# Patient Record
Sex: FEMALE | Race: BLACK OR AFRICAN AMERICAN
[De-identification: names, ages, dates, MRNs, and addresses within clinical notes are randomized per-mention and may not be internally consistent; named-entity substitution may affect disease eponyms.]

---

## 2017-07-18 ENCOUNTER — HOSPITAL ENCOUNTER (EMERGENCY)
Dept: HOSPITAL 9 - MADERS | Age: 42
Discharge: ANOTHER HOSPITAL | End: 2017-07-18
Payer: SELF-PAY

## 2017-07-18 DIAGNOSIS — E11.65: Primary | ICD-10-CM

## 2017-07-18 DIAGNOSIS — E78.5: ICD-10-CM

## 2017-07-18 DIAGNOSIS — F41.9: ICD-10-CM

## 2017-07-18 DIAGNOSIS — F32.9: ICD-10-CM

## 2017-07-18 DIAGNOSIS — I42.9: ICD-10-CM

## 2017-07-18 DIAGNOSIS — Z79.4: ICD-10-CM

## 2017-07-18 DIAGNOSIS — I50.9: ICD-10-CM

## 2017-07-18 DIAGNOSIS — E86.0: ICD-10-CM

## 2017-07-18 DIAGNOSIS — Z79.899: ICD-10-CM

## 2017-07-18 DIAGNOSIS — I25.10: ICD-10-CM

## 2017-07-18 DIAGNOSIS — Z79.82: ICD-10-CM

## 2017-07-18 DIAGNOSIS — I11.0: ICD-10-CM

## 2017-07-18 DIAGNOSIS — Z95.0: ICD-10-CM

## 2017-07-18 LAB
ALBUMIN SERPL BCG-MCNC: 4 G/DL (ref 3.5–5)
ALP SERPL-CCNC: 96 U/L (ref 40–150)
ALT SERPL W P-5'-P-CCNC: 30 U/L (ref 8–55)
ANION GAP SERPL CALC-SCNC: (no result) MMOL/L (ref 10–20)
APTT PPP: 29.4 SEC (ref 22.9–36.1)
AST SERPL-CCNC: 30 U/L (ref 5–34)
BILIRUB SERPL-MCNC: (no result) MG/DL (ref 0.2–1.2)
BUN SERPL-MCNC: 12 MG/DL (ref 7–18.7)
CALCIUM SERPL-MCNC: 9.7 MG/DL (ref 7.8–10.44)
CHLORIDE SERPL-SCNC: 90 MMOL/L (ref 98–107)
CK MB SERPL-MCNC: 0.6 NG/ML (ref 0–6.6)
CO2 SERPL-SCNC: 10 MMOL/L (ref 22–29)
COMM CRITICAL RESULTS DOC: (no result)
COMM CRITICAL RESULTS DOC: (no result)
CREAT CL PREDICTED SERPL C-G-VRATE: 0 ML/MIN (ref 70–130)
DRUG SCREEN CUTOFF: (no result)
GLOBULIN SER CALC-MCNC: 6.3 G/DL (ref 2.4–3.5)
GLUCOSE SERPL-MCNC: 571 MG/DL (ref 70–105)
GLUCOSE UR STRIP-MCNC: >=1000 MG/DL
HGB BLD-MCNC: 12.5 G/DL (ref 12–16)
INR PPP: 0.9
MCH RBC QN AUTO: 28.2 PG (ref 27–31)
MCV RBC AUTO: 78.3 FL (ref 81–99)
MDIFF COMPLETE?: YES
MEDTOX CONTROL LINE VALID?: (no result)
PLATELET # BLD AUTO: 256 THOU/UL (ref 130–400)
PLATELET BLD QL SMEAR: (no result)
POTASSIUM SERPL-SCNC: 4.5 MMOL/L (ref 3.5–5.1)
PROTHROMBIN TIME: 12.7 SEC (ref 12–14.7)
RBC # BLD AUTO: 4.43 MILL/UL (ref 4.2–5.4)
SODIUM SERPL-SCNC: 127 MMOL/L (ref 136–145)
SP GR UR STRIP: 1.03 (ref 1–1.04)
TROPONIN I SERPL DL<=0.01 NG/ML-MCNC: 0.02 NG/ML (ref ?–0.03)
WBC # BLD AUTO: 6.5 THOU/UL (ref 4.8–10.8)

## 2017-07-18 PROCEDURE — 96361 HYDRATE IV INFUSION ADD-ON: CPT

## 2017-07-18 PROCEDURE — 80053 COMPREHEN METABOLIC PANEL: CPT

## 2017-07-18 PROCEDURE — 96376 TX/PRO/DX INJ SAME DRUG ADON: CPT

## 2017-07-18 PROCEDURE — 85730 THROMBOPLASTIN TIME PARTIAL: CPT

## 2017-07-18 PROCEDURE — 81003 URINALYSIS AUTO W/O SCOPE: CPT

## 2017-07-18 PROCEDURE — 80306 DRUG TEST PRSMV INSTRMNT: CPT

## 2017-07-18 PROCEDURE — 85025 COMPLETE CBC W/AUTO DIFF WBC: CPT

## 2017-07-18 PROCEDURE — 71010: CPT

## 2017-07-18 PROCEDURE — 84443 ASSAY THYROID STIM HORMONE: CPT

## 2017-07-18 PROCEDURE — 36416 COLLJ CAPILLARY BLOOD SPEC: CPT

## 2017-07-18 PROCEDURE — 93005 ELECTROCARDIOGRAM TRACING: CPT

## 2017-07-18 PROCEDURE — 82553 CREATINE MB FRACTION: CPT

## 2017-07-18 PROCEDURE — 94760 N-INVAS EAR/PLS OXIMETRY 1: CPT

## 2017-07-18 PROCEDURE — 87040 BLOOD CULTURE FOR BACTERIA: CPT

## 2017-07-18 PROCEDURE — 96374 THER/PROPH/DIAG INJ IV PUSH: CPT

## 2017-07-18 PROCEDURE — 83605 ASSAY OF LACTIC ACID: CPT

## 2017-07-18 PROCEDURE — 83880 ASSAY OF NATRIURETIC PEPTIDE: CPT

## 2017-07-18 PROCEDURE — 85610 PROTHROMBIN TIME: CPT

## 2017-07-18 PROCEDURE — 84484 ASSAY OF TROPONIN QUANT: CPT

## 2017-07-18 NOTE — RAD
AP VIEW CHEST:

7/18/17

 

HISTORY: 

Syncope.

 

AP view chest is obtained on 7/18/17.

 

Comparison made to previous exam from 12/8/16.

 

AP view chest demonstrates a dual lead intracardiac defibrillator. Mild cardiomegaly is seen. The adarsh
ngs are well aerated. No evidence of active intrathoracic disease seen. No evidence of effusions, pn
eumonia or pneumothorax seen.

 

IMPRESSION:  

Cardiomegaly.

 

POS: [facility]

## 2018-03-16 ENCOUNTER — HOSPITAL ENCOUNTER (INPATIENT)
Dept: HOSPITAL 92 - ERS | Age: 43
LOS: 10 days | Discharge: HOME | DRG: 292 | End: 2018-03-26
Attending: FAMILY MEDICINE | Admitting: FAMILY MEDICINE
Payer: COMMERCIAL

## 2018-03-16 VITALS — BODY MASS INDEX: 22.2 KG/M2

## 2018-03-16 DIAGNOSIS — E78.5: ICD-10-CM

## 2018-03-16 DIAGNOSIS — F32.9: ICD-10-CM

## 2018-03-16 DIAGNOSIS — I42.0: ICD-10-CM

## 2018-03-16 DIAGNOSIS — F41.9: ICD-10-CM

## 2018-03-16 DIAGNOSIS — I11.0: Primary | ICD-10-CM

## 2018-03-16 DIAGNOSIS — Z88.5: ICD-10-CM

## 2018-03-16 DIAGNOSIS — E87.6: ICD-10-CM

## 2018-03-16 DIAGNOSIS — I25.10: ICD-10-CM

## 2018-03-16 DIAGNOSIS — Z79.4: ICD-10-CM

## 2018-03-16 DIAGNOSIS — Z79.82: ICD-10-CM

## 2018-03-16 DIAGNOSIS — E11.649: ICD-10-CM

## 2018-03-16 DIAGNOSIS — I95.9: ICD-10-CM

## 2018-03-16 DIAGNOSIS — I50.23: ICD-10-CM

## 2018-03-16 DIAGNOSIS — Z88.1: ICD-10-CM

## 2018-03-16 DIAGNOSIS — I08.1: ICD-10-CM

## 2018-03-16 DIAGNOSIS — N17.9: ICD-10-CM

## 2018-03-16 DIAGNOSIS — D50.9: ICD-10-CM

## 2018-03-16 DIAGNOSIS — Z95.810: ICD-10-CM

## 2018-03-16 LAB
ALBUMIN SERPL BCG-MCNC: 4.3 G/DL (ref 3.5–5)
ALP SERPL-CCNC: 57 U/L (ref 40–150)
ALT SERPL W P-5'-P-CCNC: 17 U/L (ref 8–55)
ANION GAP SERPL CALC-SCNC: 14 MMOL/L (ref 10–20)
AST SERPL-CCNC: 12 U/L (ref 5–34)
BASOPHILS # BLD AUTO: 0 THOU/UL (ref 0–0.2)
BASOPHILS NFR BLD AUTO: 0.8 % (ref 0–1)
BILIRUB SERPL-MCNC: 1.2 MG/DL (ref 0.2–1.2)
BUN SERPL-MCNC: 17 MG/DL (ref 7–18.7)
CALCIUM SERPL-MCNC: 9.5 MG/DL (ref 7.8–10.44)
CHLORIDE SERPL-SCNC: 101 MMOL/L (ref 98–107)
CK MB SERPL-MCNC: 0.5 NG/ML (ref 0–6.6)
CO2 SERPL-SCNC: 29 MMOL/L (ref 22–29)
CREAT CL PREDICTED SERPL C-G-VRATE: 0 ML/MIN (ref 70–130)
EOSINOPHIL # BLD AUTO: 0.1 THOU/UL (ref 0–0.7)
EOSINOPHIL NFR BLD AUTO: 1.3 % (ref 0–10)
GLOBULIN SER CALC-MCNC: 3.2 G/DL (ref 2.4–3.5)
GLUCOSE SERPL-MCNC: 260 MG/DL (ref 70–105)
HGB BLD-MCNC: 10.7 G/DL (ref 12–16)
LYMPHOCYTES # BLD: 1.4 THOU/UL (ref 1.2–3.4)
LYMPHOCYTES NFR BLD AUTO: 26.9 % (ref 21–51)
MCH RBC QN AUTO: 26.5 PG (ref 27–31)
MCV RBC AUTO: 78.2 FL (ref 81–99)
MONOCYTES # BLD AUTO: 0.2 THOU/UL (ref 0.11–0.59)
MONOCYTES NFR BLD AUTO: 4.6 % (ref 0–10)
NEUTROPHILS # BLD AUTO: 3.3 THOU/UL (ref 1.4–6.5)
NEUTROPHILS NFR BLD AUTO: 66.4 % (ref 42–75)
PLATELET # BLD AUTO: 202 THOU/UL (ref 130–400)
POTASSIUM SERPL-SCNC: 3.3 MMOL/L (ref 3.5–5.1)
RBC # BLD AUTO: 4.03 MILL/UL (ref 4.2–5.4)
SODIUM SERPL-SCNC: 141 MMOL/L (ref 136–145)
TROPONIN I SERPL DL<=0.01 NG/ML-MCNC: 0.01 NG/ML (ref ?–0.03)
WBC # BLD AUTO: 5 THOU/UL (ref 4.8–10.8)

## 2018-03-16 PROCEDURE — 82553 CREATINE MB FRACTION: CPT

## 2018-03-16 PROCEDURE — 85025 COMPLETE CBC W/AUTO DIFF WBC: CPT

## 2018-03-16 PROCEDURE — 93798 PHYS/QHP OP CAR RHAB W/ECG: CPT

## 2018-03-16 PROCEDURE — 93005 ELECTROCARDIOGRAM TRACING: CPT

## 2018-03-16 PROCEDURE — 96374 THER/PROPH/DIAG INJ IV PUSH: CPT

## 2018-03-16 PROCEDURE — 80053 COMPREHEN METABOLIC PANEL: CPT

## 2018-03-16 PROCEDURE — 71046 X-RAY EXAM CHEST 2 VIEWS: CPT

## 2018-03-16 PROCEDURE — 84484 ASSAY OF TROPONIN QUANT: CPT

## 2018-03-16 PROCEDURE — 80048 BASIC METABOLIC PNL TOTAL CA: CPT

## 2018-03-16 PROCEDURE — 83550 IRON BINDING TEST: CPT

## 2018-03-16 PROCEDURE — 36415 COLL VENOUS BLD VENIPUNCTURE: CPT

## 2018-03-16 PROCEDURE — 36416 COLLJ CAPILLARY BLOOD SPEC: CPT

## 2018-03-16 PROCEDURE — 82728 ASSAY OF FERRITIN: CPT

## 2018-03-16 PROCEDURE — 83540 ASSAY OF IRON: CPT

## 2018-03-16 PROCEDURE — A4216 STERILE WATER/SALINE, 10 ML: HCPCS

## 2018-03-16 PROCEDURE — 83880 ASSAY OF NATRIURETIC PEPTIDE: CPT

## 2018-03-16 PROCEDURE — 93306 TTE W/DOPPLER COMPLETE: CPT

## 2018-03-16 PROCEDURE — 85610 PROTHROMBIN TIME: CPT

## 2018-03-16 RX ADMIN — INSULIN LISPRO PRN UNIT: 100 INJECTION, SOLUTION INTRAVENOUS; SUBCUTANEOUS at 21:29

## 2018-03-16 NOTE — HP
PRIMARY CARE PHYSICIAN:  Dr. Alton Her.

 

CHIEF COMPLAINT:  Cough and shortness of breath.

 

HISTORY OF PRESENT ILLNESS:  This is a 42-year-old -American female with 
a known history of severe cardiomyopathy with ejection fraction of 15%-20% with 
a defibrillator placed.  She follows with Dr. Daily as well as with a clinic 
in [place].  She reports that for the last week, she has had shortness of breath 
and cough productive of foamy sputum.  She would see her primary care doctor 
one week ago, was given amoxicillin and Flonase, which did not help the 
symptoms.  She was called by the cardiologist's office on Tuesday and told that 
her monitor was reporting that she was fluid overloaded.  They told her to 
increase her Lasix to 2 tablets of 40 mg in the morning and 3 tablets in the 
afternoon.  She stated she has been doing this without any noticeable increase 
in urine output.  She also states she has been restricting her fluids.  She has 
persistent cough and shortness of breath especially with ambulation.  She went 
to the Cardiology's office today.  She said for a Coumadin level, although I do 
not see that she has ever been on Coumadin and they told her at that time, she 
did go to the emergency room to get her fluid status checked.  In the ER, she 
was found to have a massively elevated brain natriuretic peptide as well as her 
dyspnea and cough.  She did not have any hypoxia in the emergency room.  She 
was low normal blood pressure and mildly tachycardic.

 

PAST MEDICAL HISTORY:

1.  Dilated cardiomyopathy diagnosed in 2007, the most recent ejection fraction 
was 15%-20% in 2016 on her echocardiogram at that time.

2.  Diabetes mellitus, type 2, insulin-dependent.

3.  Hypertension.

4.  Arrhythmia, status post ablation.

5.  Dyslipidemia.

6.  Coronary artery disease.

 

PAST SURGICAL HISTORY:

1.  Cholecystectomy.

2.  Appendectomy.

3.  Bilateral oophorectomy.

4.  AICD placement.

5.  Cardiac ablation.

 

PSYCHIATRIC HISTORY:  Anxiety and depression.

 

SOCIAL HISTORY:  No tobacco use.  She did use to drink, but stopped with her 
severe heart problems many years ago.  No illicit drug use.



FAMILY HISTORY: Significant for diabetes, stroke, and CHF.

 

ALLERGIES:  AZITHROMYCIN and DARVOCET.

 

CURRENT MEDICATIONS:

1.  Amiodarone 100 mg 1/2 tablet twice a day.

2.  Potassium chloride 20 mEq p.o. daily.

3.  Furosemide 40 mg previously 1 tab in the morning, half a tablet in the 
afternoon, now 2 tablets in the morning, and 3 in the afternoon.

4.  Spironolactone 25 mg twice a day.

5.  Entresto 97/103 mg twice a day.

6.  Carvedilol 25 mg twice a day.

7.  Magnesium oxide 400 mg as directed once a day.

8.  Glipizide 10 mg twice a day.

9.  Metformin 1000 mg twice a day, uncertain with the current dose.

10.  Lipitor 20 mg daily.

11.  Aspirin 81 mg daily.

12.  Ferrous sulfate 325 mg daily.

 

REVIEW OF SYSTEMS:  Constitutional:  No fever.  She has had some chills.  Eyes:
  No double vision or blurred vision.  ENT:  She has had congestion as per HPI.
  No sore throat.  Pulmonary:  See HPI.  Cardiovascular:  No chest pain, no 
palpitations, no racing heart.  Her AICD has not fired.  Gastrointestinal:  No 
abdominal pain.  She has had some nausea just here in the emergency room, but 
otherwise she has no vomiting, no diarrhea or constipation.  Genitourinary:  No 
dysuria or hematuria.  Musculoskeletal:  She has had a little bit of low 
backache, otherwise, no other musculoskeletal complaints.  Skin:  No rashes or 
lesions.  She has noted neurologic, no new numbness, tingling, or focal 
weakness.  She does have some chronic stain pains in her bilateral feet, likely 
from diabetic neuropathy.

 

PHYSICAL EXAMINATION:

VITAL SIGNS:  Blood pressure 102/75, pulse 102, respirations 20, O2 sat 96% on 
room air, temperature 98.6.

GENERAL:  This is a well-developed, well-nourished, -American female in 
no apparent distress.

HEENT:  Pupils equal, round, and react to light.  Oropharynx clear without 
lesions, erythema, or exudate.

NECK:  Supple.  No lymphadenopathy, no thyroid nodules or enlargement, no JVD.

HEART:  Regular rate and rhythm.  No murmurs, rubs, or gallops.

LUNGS:  She has some bibasilar crackles, otherwise good air movement 
throughout.  No increased work of breathing, no wheezing.

ABDOMEN:  Soft, nontender to palpation, normoactive bowel sounds, no 
hepatosplenomegaly or other masses.

EXTREMITIES:  No clubbing, cyanosis, or edema.

SKIN:  No rashes or other lesions noted.

NEUROLOGIC:  She has intact deep tendon reflexes in all extremities and she has 
no facial droop.

PSYCHIATRIC:  Alert and oriented x3, normal mood and affect.

 

LABORATORY DATA:  CBC with hemoglobin of 10.7, hematocrit 31.5.  The rest is 
normal.  Complete metabolic panel was notable only for potassium of 3.3 and 
glucose of 260.  Her brain natriuretic peptide is elevated at 1186.  She has 
never been above 400 in our records here.  Cardiac marker set negative x1.  EKG
:  I did review the EKG done in the emergency room shows normal sinus rhythm, 
mild sinus tachycardia, normal axis, no ST segment changes, no significant 
arrhythmias.  Chest x-ray:  I did review the chest x-ray done in the emergency 
room along with the radiologist's report that show an enlarged cardiac 
silhouette along with the AICD in place.  No evidence of pulmonary edema or 
infiltrate visible.

 

ASSESSMENT:

1.  Acute exacerbation of severe congestive heart failure.  Patient does not 
seem to be responding to oral Lasix outpatient, we will try IV Lasix 60 mg 
twice a day.  If this does not work, may need to try some other medications.  
We will continue her spironolactone while increased her potassium temporarily, 
as she seems to be dropping somewhat of the increased dose of Lasix.  We will 
increase to 40 mEq twice a day and we will monitor closely.  I have to discuss 
the patient with Dr. Rodgers's on-call.  He will see her in the morning.  We 
will go ahead and get an echocardiogram, as we do not have one within the last 
couple of years.  We will also do strict I's and O's and put her on a fluid-
restricted, salt-restricted diet.

2.  Diabetes mellitus, type 2, insulin-dependent.  We will find outpatient's 
Levemir dose and resume her home insulin.  We will put her on a moderate 
sliding scale and we will monitor for her blood sugars before meals and at 
bedtime and we will put her on a diabetic diet.

3.  Hypertension.  Resume home medications.

4.  Hyperlipidemia.  Resume home statin.

5.  Gastrointestinal prophylaxis.  Put patient on Pepcid twice a day.

6.  Deep venous thrombosis prophylaxis, we will put patient on sequential 
compression devices and we will check an INR to see if she really is on 
Coumadin and if so, we will try to find out her dose.  Otherwise, we can start 
putting her on some Lovenox for deep venous thrombosis prevention.

 

CODE STATUS:  I did discuss with the patient.  She is a FULL CODE.

 

Should she be incapacitated, she states that her aunt would be her medical 
decision maker and her aunt's name is Tiffany Eckert.

 

ANAYA

## 2018-03-16 NOTE — RAD
CHEST TWO VIEWS:

3/16/18

 

HISTORY: 

Dyspnea. Heart disease.

 

COMPARISON:  

12/8/16.

 

FINDINGS:  

The cardiac silhouette is magnified and more enlarged. Pulmonary vasculature is unremarkable. Mediast
inum is midline with a dual lead left subclavian cardiac electronic device. No confluent air space co
nsolidation, pneumothorax or pleural fluid. 

 

IMPRESSION:  

Worsening cardiomegaly. No evidence of edema.

 

POS: [facility]

## 2018-03-17 LAB
ANION GAP SERPL CALC-SCNC: 14 MMOL/L (ref 10–20)
ANION GAP SERPL CALC-SCNC: 15 MMOL/L (ref 10–20)
BASOPHILS # BLD AUTO: 0 THOU/UL (ref 0–0.2)
BASOPHILS NFR BLD AUTO: 0.5 % (ref 0–1)
BUN SERPL-MCNC: 15 MG/DL (ref 7–18.7)
BUN SERPL-MCNC: 18 MG/DL (ref 7–18.7)
CALCIUM SERPL-MCNC: 9 MG/DL (ref 7.8–10.44)
CALCIUM SERPL-MCNC: 9.1 MG/DL (ref 7.8–10.44)
CHLORIDE SERPL-SCNC: 103 MMOL/L (ref 98–107)
CHLORIDE SERPL-SCNC: 104 MMOL/L (ref 98–107)
CO2 SERPL-SCNC: 25 MMOL/L (ref 22–29)
CO2 SERPL-SCNC: 25 MMOL/L (ref 22–29)
CREAT CL PREDICTED SERPL C-G-VRATE: 66 ML/MIN (ref 70–130)
CREAT CL PREDICTED SERPL C-G-VRATE: 89 ML/MIN (ref 70–130)
EOSINOPHIL # BLD AUTO: 0.1 THOU/UL (ref 0–0.7)
EOSINOPHIL NFR BLD AUTO: 1.9 % (ref 0–10)
GLUCOSE SERPL-MCNC: 126 MG/DL (ref 70–105)
GLUCOSE SERPL-MCNC: 224 MG/DL (ref 70–105)
HGB BLD-MCNC: 10.2 G/DL (ref 12–16)
INR PPP: 1.2
LYMPHOCYTES # BLD: 1.7 THOU/UL (ref 1.2–3.4)
LYMPHOCYTES NFR BLD AUTO: 37.4 % (ref 21–51)
MCH RBC QN AUTO: 26.8 PG (ref 27–31)
MCV RBC AUTO: 77 FL (ref 81–99)
MONOCYTES # BLD AUTO: 0.3 THOU/UL (ref 0.11–0.59)
MONOCYTES NFR BLD AUTO: 5.6 % (ref 0–10)
NEUTROPHILS # BLD AUTO: 2.4 THOU/UL (ref 1.4–6.5)
NEUTROPHILS NFR BLD AUTO: 54.6 % (ref 42–75)
PLATELET # BLD AUTO: 201 THOU/UL (ref 130–400)
POTASSIUM SERPL-SCNC: 2.7 MMOL/L (ref 3.5–5.1)
POTASSIUM SERPL-SCNC: 3.8 MMOL/L (ref 3.5–5.1)
PROTHROMBIN TIME: 15.3 SEC (ref 12–14.7)
RBC # BLD AUTO: 3.79 MILL/UL (ref 4.2–5.4)
SODIUM SERPL-SCNC: 139 MMOL/L (ref 136–145)
SODIUM SERPL-SCNC: 140 MMOL/L (ref 136–145)
WBC # BLD AUTO: 4.5 THOU/UL (ref 4.8–10.8)

## 2018-03-17 RX ADMIN — ASPIRIN SCH MG: 81 TABLET ORAL at 08:51

## 2018-03-17 RX ADMIN — INSULIN LISPRO PRN UNIT: 100 INJECTION, SOLUTION INTRAVENOUS; SUBCUTANEOUS at 12:52

## 2018-03-17 RX ADMIN — INSULIN LISPRO PRN UNIT: 100 INJECTION, SOLUTION INTRAVENOUS; SUBCUTANEOUS at 18:10

## 2018-03-17 RX ADMIN — INSULIN LISPRO PRN UNIT: 100 INJECTION, SOLUTION INTRAVENOUS; SUBCUTANEOUS at 21:12

## 2018-03-17 RX ADMIN — SACUBITRIL AND VALSARTAN SCH: 49; 51 TABLET, FILM COATED ORAL at 21:11

## 2018-03-17 NOTE — PDOC.PN
- Subjective


Encounter Start Date: 03/17/18


Encounter Start Time: 11:50


Subjective: Patient dizzy this AM after Coreg and Spironolactone. BP 80s. No 

other 


-: complaints. Good UOP per pt last night, not much this AM.





- Objective


Resuscitation Status: 


 











Resuscitation Status           FULL:Full Resuscitation














MAR Reviewed: Yes


Vital Signs & Weight: 


 Vital Signs (12 hours)











  Temp Pulse Resp BP Pulse Ox


 


 03/17/18 07:00  97.3 F L  108 H  16  103/66  98








 Weight











Weight                         142 lb 6.4 oz














Result Diagrams: 


 03/17/18 04:21





 03/17/18 04:21


Additional Labs: 


 Accuchecks











  03/17/18 03/16/18





  05:46 21:17


 


POC Glucose  151 H  276 H














Phys Exam





- Physical Examination


Constitutional: NAD


HEENT: moist MMs


Respiratory: no wheezing, no rhonchi


mild bibasilar rales


Cardiovascular: RRR, no significant murmur


Gastrointestinal: soft, positive bowel sounds


Musculoskeletal: no edema


Neurological: non-focal


Psychiatric: normal affect, A&O x 3





Dx/Plan


(1) Acute on chronic systolic (congestive) heart failure


Code(s): I50.23 - ACUTE ON CHRONIC SYSTOLIC (CONGESTIVE) HEART FAILURE   Status

: Acute   Comment: BP low, will decrease carvedilol dose, hold meds for now, if 

still low in 1 hour will give 250cc NS bolus.    





(2) S/P implantation of automatic cardioverter/defibrillator (AICD)


Code(s): Z95.810 - PRESENCE OF AUTOMATIC (IMPLANTABLE) CARDIAC DEFIBRILLATOR   

Status: Chronic   





(3) Hypokalemia


Code(s): E87.6 - HYPOKALEMIA   Status: Acute   Comment: worsened with IV Lasix, 

will give IV replacement   





(4) Diabetes mellitus, type II, insulin dependent


Code(s): E11.9 - TYPE 2 DIABETES MELLITUS WITHOUT COMPLICATIONS; Z79.4 - LONG 

TERM (CURRENT) USE OF INSULIN   Status: Chronic   Comment: Resume home insulins

   





(5) Hypertension


Code(s): I10 - ESSENTIAL (PRIMARY) HYPERTENSION   Status: Chronic   


Qualifiers: 


   Hypertension type: essential hypertension   Qualified Code(s): I10 - 

Essential (primary) hypertension   





(6) Hyperlipidemia


Code(s): E78.5 - HYPERLIPIDEMIA, UNSPECIFIED   Status: Chronic   





- Plan


cont current plan of care, out of bed/ambulate, DVT proph w/lovenox, DVT proph w

/SCDs


Appreciate Dr. Rodgers's assistance.


-: Plan on ECHO and [employer] interogator.





* .








- Discharge Day


Encounter end time: 12:10

## 2018-03-17 NOTE — CON
DATE OF CONSULTATION:  03/17/2018

 

HISTORY:  Mr. Carl Eckert is a 42-year-old black female who has a dilated 
cardiomyopathy, initially diagnosed in 2007.  Most recent echo was in 2016 with 
ejection fraction of 15%-20%.  She had been followed by Dr. Andino in the 
past; however, has been admitted here over the last several years and followed 
by Dr. Daily in the outpatient setting.  In 05/2016, she was wearing a LifeVest
, did have a shock from her device for ventricular tachycardia and torsades de 
pointes.  She then underwent placement of a dual chamber ICD.

 

Recently, she has been having problems with increased shortness of breath as 
well as difficulty in diuresing p.o. medications.  On 02/08/2018, she was given 
80 mg of Lasix intravenously as well as metolazone 5 mg in the Heart Failure 
Clinic.  Her ICD has shown elevated volume level since mid-January.  She was 
seen by Dr. Daily in the office on 02/16/2018.  She continued to have problems 
with poor diuresis and her furosemide was increased to 20 mg 2 tablets b.i.d. 
and Aldactone 25 mg b.i.d.  She has continued to have problems with cough, 
shortness of breath, and was not diuresing much, came to the emergency room.

 

PAST MEDICAL HISTORY:  Nonischemic cardiomyopathy, diabetes mellitus, 
hypertension, ablation of the arrhythmia approximately 10 years ago, 
hypercholesterolemia, coronary artery disease.

 

OPERATIONS:  ICD placement, cholecystectomy, appendectomy, bilateral 
oophorectomy, mapping of ventricular tachycardia here in 2007.

 

MEDICATIONS:  Aspirin 81 daily, alprazolam 0.5 b.i.d. and bedtime, amiodarone 
50 mg daily, atorvastatin 20 at bedtime, carvedilol 25 b.i.d., ferrous sulfate 
325 daily, furosemide, spironolactone 25 b.i.d., glipizide 10 mg b.i.d., insulin
, magnesium oxide 400 mg daily, metformin 1000 b.i.d., Protonix 40 daily p.r.n.
, KCl 20 mEq daily, and Entresto 97/103 b.i.d.

 

ALLERGIES:  AZITHROMYCIN.

 

SOCIAL HISTORY:  She does not smoke or drink.

 

FAMILY HISTORY:  Negative for coronary artery disease.

 

REVIEW OF SYSTEMS:  Twelve point review of systems unremarkable except as noted 
above.

 

PHYSICAL EXAMINATION:

VITAL SIGNS:  Blood pressure 103/66, pulse of 108, sinus tachycardia on the 
monitor.

HEENT:  PERRL.

NECK:  Supple.

CHEST:  Clear.

CARDIAC:  S1, S2 were normal, without any S3, S4 or murmurs.

ABDOMEN:  Normal bowel sounds, without tenderness, organomegaly.

EXTREMITIES:  Revealed no clubbing, cyanosis or edema.

NEUROLOGIC:  Grossly intact.

SKIN:  Warm and dry.

 

LABORATORY DATA:  EKG revealed sinus tachycardia, nonspecific T-wave changes.  
Hemoglobin 10.2, hematocrit 29.2, white count 4500, platelets 201,000.  INR 1.2
, sodium 139, potassium 2.7, chloride 103, carbon dioxide 25, BUN 15, 
creatinine 0.82.  BNP 1186.4, troponin I is normal.  Chest x-ray revealed 
worsening cardiomegaly and dual chamber ICD in place.  There is no evidence of 
edema.

 

IMPRESSION:

1.  Severe nonischemic cardiomyopathy with last ejection fraction of 15%-20%.  
Her OptiVol has been elevated since January and there has been difficulty in 
diuresing on a consistent basis with p.o. medications.

2.  Hypertension.

3.  Diabetes.

4.  Probable ventricular tachycardia ablation 10 years ago.

 

PLAN:  The patient has been started on furosemide 60 mg IV b.i.d.  She states 
that she has had good diuresis with this, although there are no I's and O's on 
the chart.  These have been discussed with the nurse on the unit.  She will 
continue to be diuresed and her potassium will need to be adequately replaced.  
Also, I will have her ICD interrogated again.

 

ANAYA

## 2018-03-18 LAB
ANION GAP SERPL CALC-SCNC: 17 MMOL/L (ref 10–20)
BUN SERPL-MCNC: 26 MG/DL (ref 7–18.7)
CALCIUM SERPL-MCNC: 9.2 MG/DL (ref 7.8–10.44)
CHLORIDE SERPL-SCNC: 105 MMOL/L (ref 98–107)
CO2 SERPL-SCNC: 21 MMOL/L (ref 22–29)
CREAT CL PREDICTED SERPL C-G-VRATE: 33 ML/MIN (ref 70–130)
GLUCOSE SERPL-MCNC: 193 MG/DL (ref 70–105)
HGB BLD-MCNC: 10.2 G/DL (ref 12–16)
INR PPP: 1.2
MCH RBC QN AUTO: 26.7 PG (ref 27–31)
MCV RBC AUTO: 78.5 FL (ref 81–99)
MDIFF COMPLETE?: YES
PLATELET # BLD AUTO: 179 THOU/UL (ref 130–400)
POTASSIUM SERPL-SCNC: 4.1 MMOL/L (ref 3.5–5.1)
PROTHROMBIN TIME: 15.8 SEC (ref 12–14.7)
RBC # BLD AUTO: 3.81 MILL/UL (ref 4.2–5.4)
SODIUM SERPL-SCNC: 139 MMOL/L (ref 136–145)
WBC # BLD AUTO: 6 THOU/UL (ref 4.8–10.8)

## 2018-03-18 RX ADMIN — INSULIN LISPRO PRN UNIT: 100 INJECTION, SOLUTION INTRAVENOUS; SUBCUTANEOUS at 11:46

## 2018-03-18 RX ADMIN — SACUBITRIL AND VALSARTAN SCH: 49; 51 TABLET, FILM COATED ORAL at 08:23

## 2018-03-18 RX ADMIN — ASPIRIN SCH MG: 81 TABLET ORAL at 08:17

## 2018-03-18 NOTE — PDOC.PN
- Subjective


Encounter Start Date: 03/18/18


Encounter Start Time: 08:20





Pt seen for followup re: acute on chronic systolic CHF.  Reports SOBOE.  

Reports light-headedness.





- Objective


Resuscitation Status: 


 











Resuscitation Status           FULL:Full Resuscitation














MAR Reviewed: Yes


Vital Signs & Weight: 


 Vital Signs (12 hours)











  Temp Pulse Resp BP BP Pulse Ox


 


 03/18/18 11:05  97.8 F  98  20  104/52 L   96


 


 03/18/18 08:10  97.5 F L  93  14   87/52 L  97


 


 03/18/18 04:42  97.3 F L  93  18  85/54 L   95


 


 03/18/18 04:28       96


 


 03/18/18 01:11     85/54 L  


 


 03/18/18 00:00  97.9 F  98  20  87/50 L   96








 Weight











Weight                         145 lb 1.6 oz














I&O: 


 











 03/17/18 03/18/18 03/19/18





 06:59 06:59 06:59


 


Intake Total  2047 


 


Balance  2047 











Result Diagrams: 


 03/18/18 04:10





 03/18/18 04:10


Additional Labs: 


 Accuchecks











  03/17/18 03/17/18 03/17/18





  20:18 17:01 11:25


 


POC Glucose  230 H  228 H  234 H











EKG Reviewed by me: Yes (Tele:  NSR)





Phys Exam





- Physical Examination


Constitutional: NAD


HEENT: PERRLA, moist MMs, sclera anicteric, oral pharynx no lesions


Neck: no nodes, supple, full ROM


Respiratory: no wheezing, no rales, no rhonchi, clear to auscultation bilateral


Cardiovascular: RRR


Gastrointestinal: soft, non-tender, no distention, positive bowel sounds


Neurological: moves all 4 limbs


Lymphatic: no nodes


Psychiatric: normal affect, A&O x 3





Dx/Plan


(1) Acute on chronic systolic (congestive) heart failure


Code(s): I50.23 - ACUTE ON CHRONIC SYSTOLIC (CONGESTIVE) HEART FAILURE   Status

: Acute   Comment: On IV furosemide   





(2) Hyperlipidemia


Code(s): E78.5 - HYPERLIPIDEMIA, UNSPECIFIED   Status: Chronic   





(3) Hypertension


Code(s): I10 - ESSENTIAL (PRIMARY) HYPERTENSION   Status: Chronic   


Qualifiers: 


   Hypertension type: essential hypertension   Qualified Code(s): I10 - 

Essential (primary) hypertension   


Comment: Monitor vital signs, titrate antihypertensives on hold.  Today AM BP 

was low, so antihypertensives held.   





(4) S/P implantation of automatic cardioverter/defibrillator (AICD)


Code(s): Z95.810 - PRESENCE OF AUTOMATIC (IMPLANTABLE) CARDIAC DEFIBRILLATOR   

Status: Chronic   





(5) Diabetes mellitus, type II, insulin dependent


Code(s): E11.9 - TYPE 2 DIABETES MELLITUS WITHOUT COMPLICATIONS; Z79.4 - LONG 

TERM (CURRENT) USE OF INSULIN   Status: Chronic   Comment: Continue accuchecks, 

insulin   





(6) Non-ischemic cardiomyopathy


Code(s): I42.9 - CARDIOMYOPATHY, UNSPECIFIED   Status: Chronic   





(7) Hypokalemia


Code(s): E87.6 - HYPOKALEMIA   Status: Resolved   Comment: worsened with IV 

Lasix, will give IV replacement   





- Plan


out of bed/ambulate





* .








Review of Systems





- Review of Systems


Constitutional: negative: fever, chills, sweats, weakness, malaise


Respiratory: SOB with Excertion.  negative: Cough, Dry, Shortness of Breath, 

Hemoptysis, Pleuritic Pain, Sputum, Wheezing


Cardiovascular: negative: chest pain, palpitations, orthopnea, paroxysmal 

nocturnal dyspnea, edema, light headedness


Gastrointestinal: negative: Nausea, Vomiting, Abdominal Pain, Diarrhea, 

Constipation, Melena, Hematochezia


Genitourinary: negative: Dysuria, Frequency, Incontinence, Hematuria, Retention


Neurological: Other (Light-headed)





- Medications/Allergies


Allergies/Adverse Reactions: 


 Allergies











Allergy/AdvReac Type Severity Reaction Status Date / Time


 


azithromycin Allergy Mild Rash Verified 03/16/18 19:42


 


erythromycin base Allergy   Verified 03/16/18 19:42





[From E-Mycin]     


 


Latex, Natural Rubber Allergy   Verified 03/16/18 19:42


 


propoxyphene napsylate Allergy   Verified 03/16/18 19:42





[From Darvocet-N]     











Medications: 


 Current Medications





Acetaminophen (Tylenol)  650 mg PO Q4H PRN


   PRN Reason: Headache/Fever or Pain


Acetaminophen (Tylenol)  650 mg HI Q4H PRN


   PRN Reason: Headache/Fever or Pain


Alprazolam (Xanax)  0.5 mg PO HS [facility]


   Last Admin: 03/17/18 21:09 Dose:  0.5 mg


Amiodarone HCl (Cordarone)  50 mg PO BID [facility]


   Last Admin: 03/18/18 08:44 Dose:  50 mg


Aspirin (Ecotrin)  81 mg PO DAILY [facility]


   Last Admin: 03/18/18 08:17 Dose:  81 mg


Atorvastatin Calcium (Lipitor)  20 mg PO HS [facility]


   Last Admin: 03/17/18 21:09 Dose:  20 mg


Bisacodyl (Dulcolax)  10 mg PO DAILYPRN PRN


   PRN Reason: Constipation


Carvedilol (Coreg)  12.5 mg PO PRN PRN


   PRN Reason: SBP =/> 110


Dextrose/Water (Dextrose 50%)  25 gm SLOW IVP PRN PRN


   PRN Reason: Hypoglycemia


Docusate Sodium (Colace)  100 mg PO BID [facility]


   Last Admin: 03/18/18 08:22 Dose:  Not Given


Enoxaparin Sodium (Lovenox)  40 mg SC 0900 [facility]


   Last Admin: 03/18/18 08:18 Dose:  40 mg


Ferrous Sulfate (Feosol)  325 mg PO QAM-[facility]


   Last Admin: 03/18/18 08:16 Dose:  325 mg


Furosemide (Lasix)  60 mg SLOW IVP PRN PRN


   PRN Reason: SBP =/> 110


Glipizide (Glucotrol)  10 mg PO BID-AC [facility]


   Last Admin: 03/18/18 08:16 Dose:  10 mg


Glucagon (Glucagon)  1 mg IM PRN PRN


   PRN Reason: Hypoglycemia


Dextrose/Water (D5w)  1,000 mls @ 0 mls/hr IV .Q0M PRN; As Directed


   PRN Reason: Hypoglycemia


Insulin Detemir 55 units/ (Miscellaneous Medication)  0.55 mls @ 0 mls/hr SC 

BID [facility]


   Last Admin: 03/18/18 09:39 Dose:  0.55 mls


Sodium Chloride (Normal Saline 0.9%)  250 mls @ 0 mls/hr IVPB .BOLUS PRN; As 

Directed


   PRN Reason: SBP<90


   Last Admin: 03/18/18 00:35 Dose:  250 mls


Insulin Human Lispro (Humalog)  0 units SC .MODERATE SLIDING SC PRN


   PRN Reason: Moderate Correctional Scale


   Last Admin: 03/17/18 18:10 Dose:  4 unit


Insulin Human Lispro (Humalog)  0 units SC .BEDTIME SLIDING SC PRN


   PRN Reason: Bedtime Correctional Scale


   Last Admin: 03/17/18 21:12 Dose:  3 unit


Magnesium Oxide (Magnesium Oxide)  400 mg PO DAILY [facility]


   Last Admin: 03/18/18 08:16 Dose:  400 mg


Metformin HCl (Glucophage)  1,000 mg PO BID-[facility]


   Last Admin: 03/18/18 08:16 Dose:  1,000 mg


Ondansetron HCl (Zofran Odt)  4 mg PO Q6H PRN


   PRN Reason: Nausea/Vomiting


   Last Admin: 03/17/18 15:57 Dose:  4 mg


Ondansetron HCl (Zofran)  4 mg IVP Q6H PRN


   PRN Reason: Nausea/Vomiting


Pantoprazole Sodium (Protonix)  40 mg PO DAILY PRN


   PRN Reason: Indigestion


Sacubitril/Valsartan (Entresto 49 Mg-51 Mg Tablet)  2 tab PO BID [facility]


   Last Admin: 03/18/18 08:23 Dose:  Not Given


Spironolactone (Aldactone)  25 mg PO PRN PRN


   PRN Reason: SBP =/> 110

## 2018-03-19 LAB
ANION GAP SERPL CALC-SCNC: 16 MMOL/L (ref 10–20)
BUN SERPL-MCNC: 32 MG/DL (ref 7–18.7)
CALCIUM SERPL-MCNC: 9.6 MG/DL (ref 7.8–10.44)
CHLORIDE SERPL-SCNC: 105 MMOL/L (ref 98–107)
CO2 SERPL-SCNC: 22 MMOL/L (ref 22–29)
CREAT CL PREDICTED SERPL C-G-VRATE: 50 ML/MIN (ref 70–130)
GLUCOSE SERPL-MCNC: 80 MG/DL (ref 70–105)
INR PPP: 1.1
POTASSIUM SERPL-SCNC: 4.3 MMOL/L (ref 3.5–5.1)
PROTHROMBIN TIME: 14.8 SEC (ref 12–14.7)
SODIUM SERPL-SCNC: 139 MMOL/L (ref 136–145)

## 2018-03-19 RX ADMIN — Medication SCH: at 20:41

## 2018-03-19 RX ADMIN — ASPIRIN SCH MG: 81 TABLET ORAL at 09:44

## 2018-03-19 NOTE — PDOC.PN
- Subjective


Encounter Start Date: 03/19/18


Encounter Start Time: 11:40





Pt seen for followup re: acute on chronic systolic CHF.  Feels better in terms 

of light-headedness.  No fevers or chills.





- Objective


Resuscitation Status: 


 











Resuscitation Status           FULL:Full Resuscitation














MAR Reviewed: Yes


Vital Signs & Weight: 


 Vital Signs (12 hours)











  Temp Pulse Resp BP Pulse Ox


 


 03/19/18 12:29  98.2 F  110 H  18  86/56 L  96


 


 03/19/18 07:50  97.6 F  91  19  89/50 L  95


 


 03/19/18 03:43  97.5 F L  111 H  16  105/60  99








 Weight











Weight                         146 lb 12.8 oz














I&O: 


 











 03/18/18 03/19/18 03/20/18





 06:59 06:59 06:59


 


Intake Total 2047 2065 


 


Output Total  415 


 


Balance 2047 1650 











Result Diagrams: 


 03/18/18 04:10





 03/19/18 04:08


Additional Labs: 


 Accuchecks











  03/19/18 03/19/18 03/19/18





  11:11 09:27 06:04


 


POC Glucose  82  85  72














  03/18/18 03/18/18





  21:03 15:37


 


POC Glucose  79  135 H











EKG Reviewed by me: Yes (Tele:  NSR)





Phys Exam





- Physical Examination


Constitutional: NAD


HEENT: PERRLA, moist MMs, sclera anicteric, oral pharynx no lesions


Neck: supple


Respiratory: no wheezing, no rhonchi


Bibasal crackles


Cardiovascular: RRR, no rub


Gastrointestinal: soft, non-tender, no distention, positive bowel sounds


Musculoskeletal: edema present


Neurological: moves all 4 limbs


Psychiatric: normal affect, A&O x 3





Dx/Plan


(1) Acute on chronic systolic (congestive) heart failure


Code(s): I50.23 - ACUTE ON CHRONIC SYSTOLIC (CONGESTIVE) HEART FAILURE   Status

: Acute   Comment: Furosemide on hold due to hypotension   





(2) Hyperlipidemia


Code(s): E78.5 - HYPERLIPIDEMIA, UNSPECIFIED   Status: Chronic   





(3) Hypertension


Code(s): I10 - ESSENTIAL (PRIMARY) HYPERTENSION   Status: Chronic   


Qualifiers: 


   Hypertension type: essential hypertension   Qualified Code(s): I10 - 

Essential (primary) hypertension   


Comment: Pt has been started on dopamine drip for hypotension   





(4) S/P implantation of automatic cardioverter/defibrillator (AICD)


Code(s): Z95.810 - PRESENCE OF AUTOMATIC (IMPLANTABLE) CARDIAC DEFIBRILLATOR   

Status: Chronic   





(5) Diabetes mellitus, type II, insulin dependent


Code(s): E11.9 - TYPE 2 DIABETES MELLITUS WITHOUT COMPLICATIONS; Z79.4 - LONG 

TERM (CURRENT) USE OF INSULIN   Status: Chronic   Comment: on accuchecks, 

insulin sliding scale   





(6) Non-ischemic cardiomyopathy


Code(s): I42.9 - CARDIOMYOPATHY, UNSPECIFIED   Status: Chronic   





(7) Hypokalemia


Code(s): E87.6 - HYPOKALEMIA   Status: Resolved   





- Plan





* .


Creatinine improved today





Review of Systems





- Review of Systems


Constitutional: negative: fever, chills, sweats, weakness, malaise


Respiratory: SOB with Excertion.  negative: Cough, Dry, Shortness of Breath, 

Hemoptysis, Pleuritic Pain, Sputum, Wheezing


Cardiovascular: negative: chest pain, palpitations, orthopnea, paroxysmal 

nocturnal dyspnea, edema, light headedness


Gastrointestinal: negative: Nausea, Vomiting, Abdominal Pain, Diarrhea, 

Constipation, Melena, Hematochezia


Genitourinary: negative: Dysuria, Frequency, Incontinence, Hematuria, Retention





- Medications/Allergies


Allergies/Adverse Reactions: 


 Allergies











Allergy/AdvReac Type Severity Reaction Status Date / Time


 


azithromycin Allergy Mild Rash Verified 03/16/18 19:42


 


erythromycin base Allergy   Verified 03/16/18 19:42





[From E-Mycin]     


 


Latex, Natural Rubber Allergy   Verified 03/16/18 19:42


 


propoxyphene napsylate Allergy   Verified 03/16/18 19:42





[From Darvocet-N]     











Medications: 


 Current Medications





Acetaminophen (Tylenol)  650 mg PO Q4H PRN


   PRN Reason: Headache/Fever or Pain


Acetaminophen (Tylenol)  650 mg ID Q4H PRN


   PRN Reason: Headache/Fever or Pain


Alprazolam (Xanax)  0.5 mg PO [facility]


   Last Admin: 03/18/18 21:47 Dose:  0.5 mg


Amiodarone HCl (Cordarone)  50 mg PO BID [facility]


   Last Admin: 03/19/18 09:42 Dose:  50 mg


Aspirin (Ecotrin)  81 mg PO DAILY [facility]


   Last Admin: 03/19/18 09:44 Dose:  81 mg


Atorvastatin Calcium (Lipitor)  20 mg PO HS [facility]


   Last Admin: 03/18/18 21:47 Dose:  20 mg


Bisacodyl (Dulcolax)  10 mg PO DAILYPRN PRN


   PRN Reason: Constipation


Dextrose/Water (Dextrose 50%)  25 gm SLOW IVP PRN PRN


   PRN Reason: Hypoglycemia


Docusate Sodium (Colace)  100 mg PO BID [facility]


   Last Admin: 03/19/18 09:48 Dose:  Not Given


Enoxaparin Sodium (Lovenox)  40 mg SC 0900 [facility]


   Last Admin: 03/19/18 09:45 Dose:  40 mg


Ferrous Sulfate (Feosol)  325 mg PO QAM-[facility]


   Last Admin: 03/19/18 09:44 Dose:  325 mg


Glipizide (Glucotrol)  10 mg PO BID-[facility]


   Last Admin: 03/19/18 09:44 Dose:  Not Given


Glucagon (Glucagon)  1 mg IM PRN PRN


   PRN Reason: Hypoglycemia


Dextrose/Water (D5w)  1,000 mls @ 0 mls/hr IV .Q0M PRN; As Directed


   PRN Reason: Hypoglycemia


Sodium Chloride (Normal Saline 0.9%)  250 mls @ 0 mls/hr IVPB .BOLUS PRN; As 

Directed


   PRN Reason: SBP<90


   Last Admin: 03/18/18 00:35 Dose:  250 mls


Dopamine HCl/Dextrose (Dopamine/D5w)  250 mls @ 6.17 mls/hr IVPB INF GREGG; 2.5 

MCG/KG/MIN


   PRN Reason: Protocol


   Last Admin: 03/18/18 18:25 Dose:  250 mls


Insulin Detemir 45 units/ (Miscellaneous Medication)  0.45 mls @ 0 mls/hr SC 

BID [facility]


Insulin Human Lispro (Humalog)  0 units SC .MODERATE SLIDING SC PRN


   PRN Reason: Moderate Correctional Scale


   Last Admin: 03/18/18 11:46 Dose:  4 unit


Insulin Human Lispro (Humalog)  0 units SC .BEDTIME SLIDING SC PRN


   PRN Reason: Bedtime Correctional Scale


   Last Admin: 03/17/18 21:12 Dose:  3 unit


Magnesium Oxide (Magnesium Oxide)  400 mg PO DAILY [facility]


   Last Admin: 03/19/18 09:44 Dose:  400 mg


Metformin HCl (Glucophage)  1,000 mg PO BID-[facility]


   Last Admin: 03/19/18 09:43 Dose:  1,000 mg


Ondansetron HCl (Zofran Odt)  4 mg PO Q6H PRN


   PRN Reason: Nausea/Vomiting


   Last Admin: 03/17/18 15:57 Dose:  4 mg


Ondansetron HCl (Zofran)  4 mg IVP Q6H PRN


   PRN Reason: Nausea/Vomiting


Pantoprazole Sodium (Protonix)  40 mg PO DAILY PRN


   PRN Reason: Indigestion


Sodium Chloride (Flush - Normal Saline)  10 ml IVF Q12HR GREGG


Sodium Chloride (Flush - Normal Saline)  10 ml IVF PRN PRN


   PRN Reason: Saline Flush


Spironolactone (Aldactone)  25 mg PO PRN PRN


   PRN Reason: SBP =/> 110

## 2018-03-20 LAB
ANION GAP SERPL CALC-SCNC: 15 MMOL/L (ref 10–20)
BUN SERPL-MCNC: 26 MG/DL (ref 7–18.7)
CALCIUM SERPL-MCNC: 9.7 MG/DL (ref 7.8–10.44)
CHLORIDE SERPL-SCNC: 106 MMOL/L (ref 98–107)
CO2 SERPL-SCNC: 22 MMOL/L (ref 22–29)
CREAT CL PREDICTED SERPL C-G-VRATE: 75 ML/MIN (ref 70–130)
GLUCOSE SERPL-MCNC: 77 MG/DL (ref 70–105)
INR PPP: 1.2
POTASSIUM SERPL-SCNC: 4.4 MMOL/L (ref 3.5–5.1)
PROTHROMBIN TIME: 15.1 SEC (ref 12–14.7)
SODIUM SERPL-SCNC: 139 MMOL/L (ref 136–145)

## 2018-03-20 RX ADMIN — Medication SCH ML: at 08:18

## 2018-03-20 RX ADMIN — Medication SCH: at 20:06

## 2018-03-20 RX ADMIN — ASPIRIN SCH MG: 81 TABLET ORAL at 08:17

## 2018-03-20 NOTE — PRG
DATE OF SERVICE:  03/20/2018

 

SUBJECTIVE:  Ms. Eckert is not feeling much better, not diuresing well.

 

OBJECTIVE:

VITAL SIGNS:  Blood pressure is 92/70, pulse is 100.

NECK:  Veins are distended.

LUNGS:  Clear.

CARDIAC:  Normal S1, normal S2.

ABDOMEN:  Soft, nontender.

EXTREMITIES:  No edema.

 

Blood sugars have been low, 64 is the lowest.

 

Reviewing the records, ejection fraction is severely depressed at 10%-15%.  The OptiVol shows the pat
ient still in heart failure.

 

ASSESSMENT:

1.  Congestive heart failure refractory.

2.  Cardiomyopathy.

 

PLAN:

1.  Try again with intravenous Lasix.

2.  Stop IV fluid.

3.  We will review echocardiogram.  Prognosis in this patient appears poor.  She has had progressivel
y worse heart failure despite good medical therapy.

## 2018-03-20 NOTE — PDOC.PN
- Subjective


Encounter Start Date: 03/20/18


Encounter Start Time: 07:00





Pt seen for followup re: hypotension.  Feels better, no dizziness,





- Objective


Resuscitation Status: 


 











Resuscitation Status           FULL:Full Resuscitation














MAR Reviewed: Yes


Vital Signs & Weight: 


 Vital Signs (12 hours)











  Temp Pulse Resp BP BP Pulse Ox


 


 03/20/18 08:00  98.3 F  104 H  16    97


 


 03/20/18 07:49  98.3 F  104 H  16  130/75   97


 


 03/20/18 04:00  97.6 F  108 H  16   105/70  95


 


 03/19/18 23:55      91/55 L 








 Weight











Weight                         147 lb 8 oz














I&O: 


 











 03/19/18 03/20/18 03/21/18





 06:59 06:59 06:59


 


Intake Total 2065 2157.5 


 


Output Total 415 1600 


 


Balance 1650 557.5 











Result Diagrams: 


 03/18/18 04:10





 03/20/18 04:11


Additional Labs: 


 Accuchecks











  03/20/18 03/19/18 03/19/18





  06:06 20:40 16:25


 


POC Glucose  84  115 H  83














  03/19/18 03/19/18





  14:13 11:11


 


POC Glucose  97  82











EKG Reviewed by me: Yes (Tele:  NSR)





Phys Exam





- Physical Examination


Constitutional: NAD


HEENT: PERRLA, moist MMs, sclera anicteric, oral pharynx no lesions


Respiratory: no wheezing, no rales, no rhonchi, clear to auscultation bilateral


Cardiovascular: RRR, no rub


Gastrointestinal: soft, non-tender, no distention, positive bowel sounds


Musculoskeletal: edema present


Neurological: moves all 4 limbs


Psychiatric: normal affect, A&O x 3


Skin: no rash





Dx/Plan


(1) Hypotension


Status: Acute   Comment: On dopamine drip   





(2) Acute on chronic systolic (congestive) heart failure


Code(s): I50.23 - ACUTE ON CHRONIC SYSTOLIC (CONGESTIVE) HEART FAILURE   Status

: Acute   Comment: Furosemide on hold due to hypotension


Beta blocker and ACEI on hold as well due to hypotension   





(3) Hyperlipidemia


Code(s): E78.5 - HYPERLIPIDEMIA, UNSPECIFIED   Status: Chronic   





(4) Hypertension


Code(s): I10 - ESSENTIAL (PRIMARY) HYPERTENSION   Status: Chronic   


Qualifiers: 


   Hypertension type: essential hypertension   Qualified Code(s): I10 - 

Essential (primary) hypertension   


Comment: Antihypertensives on hold due to hypotension   





(5) S/P implantation of automatic cardioverter/defibrillator (AICD)


Code(s): Z95.810 - PRESENCE OF AUTOMATIC (IMPLANTABLE) CARDIAC DEFIBRILLATOR   

Status: Chronic   





(6) Diabetes mellitus, type II, insulin dependent


Code(s): E11.9 - TYPE 2 DIABETES MELLITUS WITHOUT COMPLICATIONS; Z79.4 - LONG 

TERM (CURRENT) USE OF INSULIN   Status: Chronic   Comment: accuchecks, insulin 

sliding scale   





(7) Non-ischemic cardiomyopathy


Code(s): I42.9 - CARDIOMYOPATHY, UNSPECIFIED   Status: Chronic   





(8) Hypokalemia


Code(s): E87.6 - HYPOKALEMIA   Status: Resolved   





- Plan


DVT proph w/lovenox





* .








Review of Systems





- Review of Systems


Constitutional: negative: fever, chills, sweats, weakness, malaise


Respiratory: negative: Cough, Dry, Shortness of Breath, Hemoptysis, SOB with 

Excertion, Pleuritic Pain, Sputum, Wheezing


Cardiovascular: negative: chest pain, palpitations, orthopnea, paroxysmal 

nocturnal dyspnea, edema, light headedness


Gastrointestinal: negative: Nausea, Vomiting, Abdominal Pain, Diarrhea, 

Constipation, Melena, Hematochezia


Skin: negative: Rash, Lesions, [place], Bruising





- Medications/Allergies


Allergies/Adverse Reactions: 


 Allergies











Allergy/AdvReac Type Severity Reaction Status Date / Time


 


azithromycin Allergy Mild Rash Verified 03/16/18 19:42


 


erythromycin base Allergy   Verified 03/16/18 19:42





[From E-Mycin]     


 


Latex, Natural Rubber Allergy   Verified 03/16/18 19:42


 


propoxyphene napsylate Allergy   Verified 03/16/18 19:42





[From Darvocet-N]     











Medications: 


 Current Medications





Acetaminophen (Tylenol)  650 mg PO Q4H PRN


   PRN Reason: Headache/Fever or Pain


Acetaminophen (Tylenol)  650 mg [place] Q4H PRN


   PRN Reason: Headache/Fever or Pain


Alprazolam (Xanax)  0.5 mg PO HS [facility]


   Last Admin: 03/19/18 20:41 Dose:  0.5 mg


Amiodarone HCl (Cordarone)  50 mg PO BID [facility]


   Last Admin: 03/20/18 08:17 Dose:  50 mg


Aspirin (Ecotrin)  81 mg PO DAILY [facility]


   Last Admin: 03/20/18 08:17 Dose:  81 mg


Atorvastatin Calcium (Lipitor)  20 mg PO HS [facility]


   Last Admin: 03/19/18 20:41 Dose:  20 mg


Bisacodyl (Dulcolax)  10 mg PO DAILYPRN PRN


   PRN Reason: Constipation


Dextrose/Water (Dextrose 50%)  25 gm SLOW IVP PRN PRN


   PRN Reason: Hypoglycemia


Docusate Sodium (Colace)  100 mg PO BID [facility]


   Last Admin: 03/20/18 08:17 Dose:  Not Given


Enoxaparin Sodium (Lovenox)  40 mg SC 0900 [facility]


   Last Admin: 03/20/18 08:16 Dose:  40 mg


Ferrous Sulfate (Feosol)  325 mg PO QAM-[facility]


   Last Admin: 03/20/18 08:17 Dose:  325 mg


Glipizide (Glucotrol)  10 mg PO BID-[facility]


   Last Admin: 03/20/18 08:17 Dose:  10 mg


Glucagon (Glucagon)  1 mg IM PRN PRN


   PRN Reason: Hypoglycemia


Dextrose/Water (D5w)  1,000 mls @ 0 mls/hr IV .Q0M PRN; As Directed


   PRN Reason: Hypoglycemia


Sodium Chloride (Normal Saline 0.9%)  250 mls @ 0 mls/hr IVPB .BOLUS PRN; As 

Directed


   PRN Reason: SBP<90


   Last Admin: 03/18/18 00:35 Dose:  250 mls


Dopamine HCl/Dextrose (Dopamine/D5w)  250 mls @ 6.17 mls/hr IVPB INF GREGG; 2.5 

MCG/KG/MIN


   PRN Reason: Protocol


   Last Admin: 03/20/18 08:15 Dose:  250 mls


Insulin Detemir 45 units/ (Miscellaneous Medication)  0.45 mls @ 0 mls/hr SC 

BID [facility]


   Last Admin: 03/20/18 08:18 Dose:  Not Given


Sodium Chloride (Normal Saline 0.9%)  1,000 mls @ 50 mls/hr IV .Q20H [facility]


   Last Admin: 03/19/18 19:00 Dose:  1,000 mls


Insulin Human Lispro (Humalog)  0 units SC .MODERATE SLIDING SC PRN


   PRN Reason: Moderate Correctional Scale


   Last Admin: 03/18/18 11:46 Dose:  4 unit


Insulin Human Lispro (Humalog)  0 units SC .BEDTIME SLIDING SC PRN


   PRN Reason: Bedtime Correctional Scale


   Last Admin: 03/17/18 21:12 Dose:  3 unit


Magnesium Oxide (Magnesium Oxide)  400 mg PO DAILY [facility]


   Last Admin: 03/20/18 08:17 Dose:  400 mg


Metformin HCl (Glucophage)  1,000 mg PO BID-[facility]


   Last Admin: 03/20/18 08:16 Dose:  1,000 mg


Ondansetron HCl (Zofran Odt)  4 mg PO Q6H PRN


   PRN Reason: Nausea/Vomiting


   Last Admin: 03/19/18 13:32 Dose:  4 mg


Ondansetron HCl (Zofran)  4 mg IVP Q6H PRN


   PRN Reason: Nausea/Vomiting


Pantoprazole Sodium (Protonix)  40 mg PO DAILY PRN


   PRN Reason: Indigestion


Sodium Chloride (Flush - Normal Saline)  10 ml IVF Q12HR [facility]


   Last Admin: 03/20/18 08:18 Dose:  10 ml


Sodium Chloride (Flush - Normal Saline)  10 ml IVF PRN PRN


   PRN Reason: Saline Flush


Spironolactone (Aldactone)  25 mg PO PRN PRN


   PRN Reason: SBP =/> 110

## 2018-03-21 LAB
ANION GAP SERPL CALC-SCNC: 10 MMOL/L (ref 10–20)
BUN SERPL-MCNC: 19 MG/DL (ref 7–18.7)
CALCIUM SERPL-MCNC: 9.4 MG/DL (ref 7.8–10.44)
CHLORIDE SERPL-SCNC: 107 MMOL/L (ref 98–107)
CO2 SERPL-SCNC: 24 MMOL/L (ref 22–29)
CREAT CL PREDICTED SERPL C-G-VRATE: 90 ML/MIN (ref 70–130)
GLUCOSE SERPL-MCNC: 145 MG/DL (ref 70–105)
IRON SERPL-MCNC: 38 UG/DL (ref 50–170)
IRON SERPL-MCNC: 38 UG/DL (ref 50–170)
POTASSIUM SERPL-SCNC: 4.3 MMOL/L (ref 3.5–5.1)
SODIUM SERPL-SCNC: 137 MMOL/L (ref 136–145)
UIBC SERPL-MCNC: 316 MCG/DL (ref 265–497)

## 2018-03-21 RX ADMIN — Medication SCH: at 21:55

## 2018-03-21 RX ADMIN — ASPIRIN SCH MG: 81 TABLET ORAL at 08:32

## 2018-03-21 RX ADMIN — Medication SCH: at 08:38

## 2018-03-21 NOTE — PDOC.PN
- Subjective


Encounter Start Date: 03/21/18


Encounter Start Time: 07:00





Pt seen for followup re: CHF exacerbation.  Denies chest pain.  No cough or 

fever.





- Objective


Resuscitation Status: 


 











Resuscitation Status           FULL:Full Resuscitation














MAR Reviewed: Yes


Vital Signs & Weight: 


 Vital Signs (12 hours)











  Temp Pulse Resp BP BP


 


 03/21/18 08:45  98.8 F  101 H  18  


 


 03/21/18 08:00  98.8 F  101 H  18  94/60 


 


 03/21/18 04:15  97.8 F  101 H  18   101/67








 Weight











Weight                         151 lb 11.2 oz














I&O: 


 











 03/20/18 03/21/18 03/22/18





 06:59 06:59 06:59


 


Intake Total 2157.5 1304.4 


 


Output Total 1600 1160 


 


Balance 557.5 144.4 











Result Diagrams: 


 03/18/18 04:10





 03/21/18 04:57


Additional Labs: 


 Accuchecks











  03/21/18 03/20/18 03/20/18





  10:28 21:14 16:19


 


POC Glucose  196 H  91  100














  03/20/18





  14:28


 


POC Glucose  116 H











EKG Reviewed by me: Yes (Tele:  NSR)





Phys Exam





- Physical Examination


Constitutional: NAD


HEENT: moist MMs


Neck: supple


[place] crackles


Cardiovascular: RRR


Gastrointestinal: soft


Neurological: moves all 4 limbs


Psychiatric: normal affect


Skin: no rash





Dx/Plan


(1) Acute on chronic systolic (congestive) heart failure


Code(s): I50.23 - ACUTE ON CHRONIC SYSTOLIC (CONGESTIVE) HEART FAILURE   Status

: Acute   Comment: Received furosemide


Beta blocker and ACEI on hold  due to hypotension   





(2) Hypotension


Status: Acute   Comment: On dopamine drip as needed   





(3) Hyperlipidemia


Code(s): E78.5 - HYPERLIPIDEMIA, UNSPECIFIED   Status: Chronic   





(4) Hypertension


Code(s): I10 - ESSENTIAL (PRIMARY) HYPERTENSION   Status: Chronic   


Qualifiers: 


   Hypertension type: essential hypertension   Qualified Code(s): I10 - 

Essential (primary) hypertension   


Comment: Antihypertensives on hold due to hypotension   





(5) S/P implantation of automatic cardioverter/defibrillator (AICD)


Code(s): Z95.810 - PRESENCE OF AUTOMATIC (IMPLANTABLE) CARDIAC DEFIBRILLATOR   

Status: Chronic   





(6) Diabetes mellitus, type II, insulin dependent


Code(s): E11.9 - TYPE 2 DIABETES MELLITUS WITHOUT COMPLICATIONS; Z79.4 - LONG 

TERM (CURRENT) USE OF INSULIN   Status: Chronic   Comment: Continue accuchecks, 

insulin sliding scale   





(7) Non-ischemic cardiomyopathy


Code(s): I42.9 - CARDIOMYOPATHY, UNSPECIFIED   Status: Chronic   





(8) Hypokalemia


Code(s): E87.6 - HYPOKALEMIA   Status: Resolved   





- Plan





* .








Review of Systems





- Review of Systems


Respiratory: negative: Cough, Dry, Shortness of Breath, Hemoptysis, SOB with 

Excertion, Pleuritic Pain, Sputum, Wheezing


Cardiovascular: negative: chest pain, palpitations, orthopnea, paroxysmal 

nocturnal dyspnea, edema, light headedness





- Medications/Allergies


Allergies/Adverse Reactions: 


 Allergies











Allergy/AdvReac Type Severity Reaction Status Date / Time


 


azithromycin Allergy Mild Rash Verified 03/16/18 19:42


 


erythromycin base Allergy   Verified 03/16/18 19:42





[From E-Mycin]     


 


Latex, Natural Rubber Allergy   Verified 03/16/18 19:42


 


propoxyphene napsylate Allergy   Verified 03/16/18 19:42





[From Darvocet-N]     











Medications: 


 Current Medications





Acetaminophen (Tylenol)  650 mg PO Q4H PRN


   PRN Reason: Headache/Fever or Pain


Acetaminophen (Tylenol)  650 mg [place] Q4H PRN


   PRN Reason: Headache/Fever or Pain


Alprazolam (Xanax)  0.5 mg PO [facility]


   Last Admin: 03/20/18 20:03 Dose:  0.5 mg


Atorvastatin Calcium (Lipitor)  20 mg PO [facility]


   Last Admin: 03/20/18 20:03 Dose:  20 mg


Bisacodyl (Dulcolax)  10 mg PO DAILYPRN PRN


   PRN Reason: Constipation


Dextrose/Water (Dextrose 50%)  25 gm SLOW IVP PRN PRN


   PRN Reason: Hypoglycemia


   Last Admin: 03/20/18 13:31 Dose:  25 gm


Docusate Sodium (Colace)  100 mg PO BID [facility]


   Last Admin: 03/21/18 08:34 Dose:  100 mg


Enoxaparin Sodium (Lovenox)  40 mg SC 0900 [facility]


   Last Admin: 03/21/18 08:35 Dose:  40 mg


Ferrous Sulfate (Feosol)  325 mg PO QA-[facility]


   Last Admin: 03/21/18 08:37 Dose:  325 mg


Furosemide (Lasix)  20 mg SLOW IVP 0600,1400 [facility]


   Last Admin: 03/21/18 05:26 Dose:  20 mg


Glipizide (Glucotrol)  10 mg PO BID-[facility]


   Last Admin: 03/21/18 08:34 Dose:  10 mg


Glucagon (Glucagon)  1 mg IM PRN PRN


   PRN Reason: Hypoglycemia


Dextrose/Water (D5w)  1,000 mls @ 0 mls/hr IV .Q0M PRN; As Directed


   PRN Reason: Hypoglycemia


Sodium Chloride (Normal Saline 0.9%)  250 mls @ 0 mls/hr IVPB .BOLUS PRN; As 

Directed


   PRN Reason: SBP<90


   Last Admin: 03/18/18 00:35 Dose:  250 mls


Insulin Detemir 45 units/ (Miscellaneous Medication)  0.45 mls @ 0 mls/hr SC 

BID [facility]


   Last Admin: 03/21/18 09:47 Dose:  0.45 mls


Dopamine HCl/Dextrose (Dopamine/D5w)  250 mls @ 3.702 mls/hr IVPB INF GREGG; 1.5 

MCG/KG/MIN


   PRN Reason: Protocol


Insulin Human Lispro (Humalog)  0 units SC .MODERATE SLIDING SC PRN


   PRN Reason: Moderate Correctional Scale


   Last Admin: 03/18/18 11:46 Dose:  4 unit


Insulin Human Lispro (Humalog)  0 units SC .BEDTIME SLIDING SC PRN


   PRN Reason: Bedtime Correctional Scale


   Last Admin: 03/17/18 21:12 Dose:  3 unit


Magnesium Oxide (Magnesium Oxide)  400 mg PO DAILY [facility]


   Last Admin: 03/21/18 08:34 Dose:  400 mg


Metformin HCl (Glucophage)  1,000 mg PO BID-[facility]


   Last Admin: 03/21/18 08:34 Dose:  1,000 mg


Ondansetron HCl (Zofran Odt)  4 mg PO Q6H PRN


   PRN Reason: Nausea/Vomiting


   Last Admin: 03/20/18 11:52 Dose:  4 mg


Ondansetron HCl (Zofran)  4 mg IVP Q6H PRN


   PRN Reason: Nausea/Vomiting


Pantoprazole Sodium (Protonix)  40 mg PO DAILY PRN


   PRN Reason: Indigestion


Sodium Chloride (Flush - Normal Saline)  10 ml IVF Q12HR [facility]


   Last Admin: 03/21/18 08:38 Dose:  Not Given


Sodium Chloride (Flush - Normal Saline)  10 ml IVF PRN PRN


   PRN Reason: Saline Flush


Spironolactone (Aldactone)  25 mg PO PRN PRN


   PRN Reason: SBP =/> 110


Spironolactone (Aldactone)  25 mg PO BID-[facility]


   Last Admin: 03/21/18 08:33 Dose:  25 mg

## 2018-03-21 NOTE — PRG
DATE OF SERVICE:  03/21/2018

 

SUBJECTIVE:  Ms. Eckert is somewhat nauseated, does not feel well.

 

PHYSICAL EXAMINATION:

VITAL SIGNS:  Her blood pressure is 96/60, pulse is 120, sinus.

NECK:  Veins are not distended.

LUNGS:  Clear.

CARDIAC:  She is tachycardic, no murmur.

ABDOMEN:  Soft, nontender.

EXTREMITIES:  No clubbing, cyanosis or edema.

 

Checking the OptiVol yesterday was very high.  Echocardiogram showed ejection fraction 10%-15%, which
 is worse than it was 2 years ago.  The patient is receiving iron for iron deficiency anemia.  Her he
moglobin was not severely low at 10.2, but her iron level was low at 38 and the ferritin 89.25.

 

ASSESSMENT:  Cardiomyopathy, severe, worsened since being treated for 2 years for a cardiomyopathy wi
th ACE inhibitors, beta blockers and subsequently Entresto instead of ACE inhibitors.

 

PLAN:

1.  She is receiving iron.

2.  Diuretics have been reinstituted.

3.  Prognosis appears poor.  She is really not improving, in fact gotten worse.  Consideration for re
ferral for possible transplantation was discussed with the patient.  She then tried to absorb some of
 this information.  She thinks if that is the best road, then she is certainly willing to pursue this
.

## 2018-03-22 LAB
ANION GAP SERPL CALC-SCNC: 14 MMOL/L (ref 10–20)
BUN SERPL-MCNC: 20 MG/DL (ref 7–18.7)
CALCIUM SERPL-MCNC: 9.3 MG/DL (ref 7.8–10.44)
CHLORIDE SERPL-SCNC: 105 MMOL/L (ref 98–107)
CO2 SERPL-SCNC: 24 MMOL/L (ref 22–29)
CREAT CL PREDICTED SERPL C-G-VRATE: 83 ML/MIN (ref 70–130)
GLUCOSE SERPL-MCNC: 87 MG/DL (ref 70–105)
POTASSIUM SERPL-SCNC: 3.9 MMOL/L (ref 3.5–5.1)
SODIUM SERPL-SCNC: 139 MMOL/L (ref 136–145)

## 2018-03-22 RX ADMIN — Medication SCH ML: at 20:22

## 2018-03-22 RX ADMIN — Medication PRN ML: at 09:17

## 2018-03-22 RX ADMIN — Medication SCH ML: at 10:22

## 2018-03-22 NOTE — PRG
DATE OF SERVICE:  03/22/2018

 

SUBJECTIVE:  Ms. Eckert is doing better.

 

OBJECTIVE:

VITAL SIGNS:  Her blood pressure is running between 91 systolic and 102 systolic.  Pulse is slightly 
better at 105, was up to 120 yesterday.

LUNGS:  Clear.

CARDIAC:  Normal S1, normal S2.

ABDOMEN:  Soft, nontender.

EXTREMITIES:  There is no edema.

 

ASSESSMENT:  Congestive heart failure, systolic, acute on chronic with a worsening ejection fraction 
despite medicine, is relatively low blood pressure.

 

PLAN:

1.  Continue furosemide.

2.  We will start again on carvedilol, hold if systolic blood pressure is under 90.

3.  Hopefully can resume angiotensin receptor blocker for tomorrow.

4.  Check base met tomorrow.

## 2018-03-22 NOTE — PDOC.PN
- Subjective


Encounter Start Date: 03/22/18


Encounter Start Time: 07:00





Pt seen for followup re: CHF exacerbation.  Denies dizziness.  No nausea or 

vomiting.  Feels weak.





- Objective


Resuscitation Status: 


 











Resuscitation Status           FULL:Full Resuscitation














MAR Reviewed: Yes


Vital Signs & Weight: 


 Vital Signs (12 hours)











  Temp Pulse Resp BP BP Pulse Ox


 


 03/22/18 11:58  98.3 F  105 H  21 H   102/57 L  96


 


 03/22/18 08:00  98.3 F  106 H  20   91/52 L  94 L


 


 03/22/18 04:00  97.7 F  98  18  103/63   94 L








 Weight











Weight                         143 lb 14.4 oz














I&O: 


 











 03/21/18 03/22/18 03/23/18





 06:59 06:59 06:59


 


Intake Total 1304.4 1214 


 


Output Total 1160 1884 


 


Balance 144.4 -670 











Result Diagrams: 


 03/18/18 04:10





 03/22/18 05:08


Additional Labs: 


 Accuchecks











  03/22/18 03/21/18 03/21/18





  06:00 19:59 16:25


 


POC Glucose  85  121 H  93














  03/21/18





  05:32


 


POC Glucose  149 H











EKG Reviewed by me: Yes





Phys Exam





- Physical Examination


Constitutional: NAD


HEENT: PERRLA, moist MMs, sclera anicteric, oral pharynx no lesions


Neck: supple


Respiratory: no wheezing, no rhonchi


Bibasal crackles


Cardiovascular: RRR, no rub


Musculoskeletal: no edema


Neurological: moves all 4 limbs


Psychiatric: normal affect, A&O x 3





Dx/Plan


(1) Acute on chronic systolic (congestive) heart failure


Code(s): I50.23 - ACUTE ON CHRONIC SYSTOLIC (CONGESTIVE) HEART FAILURE   Status

: Acute   Comment: Cardiology service discussed with pt re: possibility of 

cardiac transplant.


Received furosemide.


Beta blocker and ACEI on hold  due to hypotension   





(2) Hypotension


Status: Acute   Comment: On dopamine drip   





(3) Hyperlipidemia


Code(s): E78.5 - HYPERLIPIDEMIA, UNSPECIFIED   Status: Chronic   





(4) Hypertension


Code(s): I10 - ESSENTIAL (PRIMARY) HYPERTENSION   Status: Chronic   


Qualifiers: 


   Hypertension type: essential hypertension   Qualified Code(s): I10 - 

Essential (primary) hypertension   


Comment: Antihypertensives on hold, pt is on dopamine drip   





(5) S/P implantation of automatic cardioverter/defibrillator (AICD)


Code(s): Z95.810 - PRESENCE OF AUTOMATIC (IMPLANTABLE) CARDIAC DEFIBRILLATOR   

Status: Chronic   





(6) Diabetes mellitus, type II, insulin dependent


Code(s): E11.9 - TYPE 2 DIABETES MELLITUS WITHOUT COMPLICATIONS; Z79.4 - LONG 

TERM (CURRENT) USE OF INSULIN   Status: Chronic   Comment: accuchecks, insulin 

sliding scale   





(7) Non-ischemic cardiomyopathy


Code(s): I42.9 - CARDIOMYOPATHY, UNSPECIFIED   Status: Chronic   





(8) Hypokalemia


Code(s): E87.6 - HYPOKALEMIA   Status: Resolved   





- Plan





* .








Review of Systems





- Review of Systems


Constitutional: weakness.  negative: fever, chills, sweats, malaise


Respiratory: SOB with Excertion.  negative: Cough, Dry, Shortness of Breath, 

Hemoptysis, Pleuritic Pain, Sputum, Wheezing


Cardiovascular: negative: chest pain, palpitations, orthopnea, paroxysmal 

nocturnal dyspnea, edema, light headedness


Gastrointestinal: negative: Nausea, Vomiting, Abdominal Pain, Diarrhea, 

Constipation, Melena, Hematochezia


Genitourinary: negative: Dysuria, Frequency, Incontinence, Hematuria, Retention





- Medications/Allergies


Allergies/Adverse Reactions: 


 Allergies











Allergy/AdvReac Type Severity Reaction Status Date / Time


 


azithromycin Allergy Mild Rash Verified 03/16/18 19:42


 


erythromycin base Allergy   Verified 03/16/18 19:42





[From E-Mycin]     


 


Latex, Natural Rubber Allergy   Verified 03/16/18 19:42


 


propoxyphene napsylate Allergy   Verified 03/16/18 19:42





[From Darvocet-N]     











Medications: 


 Current Medications





Acetaminophen (Tylenol)  650 mg PO Q4H PRN


   PRN Reason: Headache/Fever or Pain


Acetaminophen (Tylenol)  650 mg HI Q4H PRN


   PRN Reason: Headache/Fever or Pain


Alprazolam (Xanax)  0.5 mg PO HS [facility]


   Last Admin: 03/21/18 21:41 Dose:  0.5 mg


Atorvastatin Calcium (Lipitor)  20 mg PO HS [facility]


   Last Admin: 03/21/18 21:41 Dose:  20 mg


Bisacodyl (Dulcolax)  10 mg PO DAILYPRN PRN


   PRN Reason: Constipation


Dextrose/Water (Dextrose 50%)  25 gm SLOW IVP PRN PRN


   PRN Reason: Hypoglycemia


   Last Admin: 03/20/18 13:31 Dose:  25 gm


Docusate Sodium (Colace)  100 mg PO BID [facility]


   Last Admin: 03/22/18 09:16 Dose:  100 mg


Enoxaparin Sodium (Lovenox)  40 mg SC 0900 [facility]


   Last Admin: 03/22/18 09:16 Dose:  40 mg


Ferrous Sulfate (Feosol)  325 mg PO QAM-[facility]


   Last Admin: 03/22/18 09:16 Dose:  325 mg


Furosemide (Lasix)  20 mg SLOW IVP 0600,1400 [facility]


   Last Admin: 03/22/18 09:25 Dose:  Not Given


Glipizide (Glucotrol)  10 mg PO BID-[facility]


   Last Admin: 03/22/18 09:16 Dose:  10 mg


Glucagon (Glucagon)  1 mg IM PRN PRN


   PRN Reason: Hypoglycemia


Dextrose/Water (D5w)  1,000 mls @ 0 mls/hr IV .Q0M PRN; As Directed


   PRN Reason: Hypoglycemia


Sodium Chloride (Normal Saline 0.9%)  250 mls @ 0 mls/hr IVPB .BOLUS PRN; As 

Directed


   PRN Reason: SBP<90


   Last Admin: 03/18/18 00:35 Dose:  250 mls


Insulin Detemir 45 units/ (Miscellaneous Medication)  0.45 mls @ 0 mls/hr SC 

BID [facility]


   Last Admin: 03/22/18 10:21 Dose:  0.45 mls


Dopamine HCl/Dextrose (Dopamine/D5w)  250 mls @ 3.702 mls/hr IVPB INF [facility]; 1.5 

MCG/KG/MIN


   PRN Reason: Protocol


   Last Admin: 03/22/18 09:19 Dose:  250 mls


Insulin Human Lispro (Humalog)  0 units SC .MODERATE SLIDING SC PRN


   PRN Reason: Moderate Correctional Scale


   Last Admin: 03/18/18 11:46 Dose:  4 unit


Insulin Human Lispro (Humalog)  0 units SC .BEDTIME SLIDING SC PRN


   PRN Reason: Bedtime Correctional Scale


   Last Admin: 03/17/18 21:12 Dose:  3 unit


Magnesium Oxide (Magnesium Oxide)  400 mg PO DAILY [facility]


   Last Admin: 03/22/18 09:16 Dose:  400 mg


Metformin HCl (Glucophage)  1,000 mg PO BID-[facility]


   Last Admin: 03/22/18 09:16 Dose:  1,000 mg


Ondansetron HCl (Zofran Odt)  4 mg PO Q6H PRN


   PRN Reason: Nausea/Vomiting


   Last Admin: 03/21/18 14:43 Dose:  4 mg


Ondansetron HCl (Zofran)  4 mg IVP Q6H PRN


   PRN Reason: Nausea/Vomiting


Pantoprazole Sodium (Protonix)  40 mg PO DAILY PRN


   PRN Reason: Indigestion


Sodium Chloride (Flush - Normal Saline)  10 ml IVF Q12HR [facility]


   Last Admin: 03/22/18 10:22 Dose:  10 ml


Sodium Chloride (Flush - Normal Saline)  10 ml IVF PRN PRN


   PRN Reason: Saline Flush


   Last Admin: 03/22/18 09:17 Dose:  10 ml


Spironolactone (Aldactone)  25 mg PO PRN PRN


   PRN Reason: SBP =/> 110


Spironolactone (Aldactone)  25 mg PO BID-[facility]


   Last Admin: 03/22/18 09:16 Dose:  25 mg

## 2018-03-23 LAB
ANION GAP SERPL CALC-SCNC: 13 MMOL/L (ref 10–20)
BUN SERPL-MCNC: 19 MG/DL (ref 7–18.7)
CALCIUM SERPL-MCNC: 9.4 MG/DL (ref 7.8–10.44)
CHLORIDE SERPL-SCNC: 106 MMOL/L (ref 98–107)
CO2 SERPL-SCNC: 24 MMOL/L (ref 22–29)
CREAT CL PREDICTED SERPL C-G-VRATE: 89 ML/MIN (ref 70–130)
GLUCOSE SERPL-MCNC: 47 MG/DL (ref 70–105)
POTASSIUM SERPL-SCNC: 4 MMOL/L (ref 3.5–5.1)
SODIUM SERPL-SCNC: 139 MMOL/L (ref 136–145)

## 2018-03-23 RX ADMIN — Medication SCH ML: at 20:45

## 2018-03-23 RX ADMIN — Medication PRN ML: at 06:16

## 2018-03-23 RX ADMIN — Medication SCH ML: at 08:43

## 2018-03-23 NOTE — PDOC.PN
- Subjective


Encounter Start Date: 03/23/18


Encounter Start Time: 07:40





Pt seen for followup re: CHF exacerbation.  Sitting in chair, feels better.  





- Objective


Resuscitation Status: 


 











Resuscitation Status           FULL:Full Resuscitation














MAR Reviewed: Yes


Vital Signs & Weight: 


 Vital Signs (12 hours)











  Temp Pulse Resp BP Pulse Ox


 


 03/23/18 12:05  97.9 F  108 H  16  115/70  100


 


 03/23/18 08:35  98.0 F  99  14  99/68  98


 


 03/23/18 04:00  97.6 F  103 H  18  93/62  96








 Weight











Weight                         145 lb 4 oz














I&O: 


 











 03/22/18 03/23/18 03/24/18





 06:59 06:59 06:59


 


Intake Total 1214 1217 


 


Output Total 6804 1450 


 


Balance -670 -233 











Result Diagrams: 


 03/18/18 04:10





 03/23/18 04:57


Additional Labs: 


 Accuchecks











  03/23/18 03/23/18 03/23/18





  10:43 07:18 05:37


 


POC Glucose  115 H  70  72














  03/22/18 03/22/18 03/22/18





  20:20 17:50 16:59


 


POC Glucose  64 L  95  62 L











EKG Reviewed by me: Yes (Tele; sinus tachycardia)





Phys Exam





- Physical Examination


Constitutional: NAD


HEENT: moist MMs


Neck: supple


Respiratory: clear to auscultation bilateral


S1, S2, reg, tachy


Gastrointestinal: soft


Neurological: moves all 4 limbs


Psychiatric: normal affect





Dx/Plan


(1) Acute on chronic systolic (congestive) heart failure


Code(s): I50.23 - ACUTE ON CHRONIC SYSTOLIC (CONGESTIVE) HEART FAILURE   Status

: Acute   Comment: Cardiology service discussed with pt re: possibility of 

cardiac transplant.


Pt getting furosemide when blood pressure allows   





(2) Hypotension


Status: Acute   Comment: dopamine drip discontinued   





(3) Hyperlipidemia


Code(s): E78.5 - HYPERLIPIDEMIA, UNSPECIFIED   Status: Chronic   





(4) Hypertension


Code(s): I10 - ESSENTIAL (PRIMARY) HYPERTENSION   Status: Chronic   


Qualifiers: 


   Hypertension type: essential hypertension   Qualified Code(s): I10 - 

Essential (primary) hypertension   


Comment: Trying to reinstate antihypertensives (beta blocker and ARB).   





(5) S/P implantation of automatic cardioverter/defibrillator (AICD)


Code(s): Z95.810 - PRESENCE OF AUTOMATIC (IMPLANTABLE) CARDIAC DEFIBRILLATOR   

Status: Chronic   





(6) Diabetes mellitus, type II, insulin dependent


Code(s): E11.9 - TYPE 2 DIABETES MELLITUS WITHOUT COMPLICATIONS; Z79.4 - LONG 

TERM (CURRENT) USE OF INSULIN   Status: Chronic   Comment: accuchecks, insulin 

sliding scale   





(7) Non-ischemic cardiomyopathy


Code(s): I42.9 - CARDIOMYOPATHY, UNSPECIFIED   Status: Chronic   





- Plan


out of bed/ambulate





* .








Review of Systems





- Review of Systems


Respiratory: SOB with Excertion.  negative: Cough, Dry, Shortness of Breath, 

Hemoptysis, Pleuritic Pain, Sputum, Wheezing


Cardiovascular: negative: chest pain, palpitations, orthopnea, paroxysmal 

nocturnal dyspnea, edema, light headedness





- Medications/Allergies


Allergies/Adverse Reactions: 


 Allergies











Allergy/AdvReac Type Severity Reaction Status Date / Time


 


azithromycin Allergy Mild Rash Verified 03/16/18 19:42


 


erythromycin base Allergy   Verified 03/16/18 19:42





[From E-Mycin]     


 


Latex, Natural Rubber Allergy   Verified 03/16/18 19:42


 


propoxyphene napsylate Allergy   Verified 03/16/18 19:42





[From Darvocet-N]     











Medications: 


 Current Medications





Acetaminophen (Tylenol)  650 mg PO Q4H PRN


   PRN Reason: Headache/Fever or Pain


Acetaminophen (Tylenol)  650 mg ID Q4H PRN


   PRN Reason: Headache/Fever or Pain


Alprazolam (Xanax)  0.5 mg PO [facility]


   Last Admin: 03/22/18 20:21 Dose:  0.5 mg


Atorvastatin Calcium (Lipitor)  20 mg PO [facility]


   Last Admin: 03/22/18 20:21 Dose:  20 mg


Bisacodyl (Dulcolax)  10 mg PO DAILYPRN PRN


   PRN Reason: Constipation


Carvedilol (Coreg)  3.125 mg PO BID-[facility]


Dextrose/Water (Dextrose 50%)  25 gm SLOW IVP PRN PRN


   PRN Reason: Hypoglycemia


   Last Admin: 03/20/18 13:31 Dose:  25 gm


Docusate Sodium (Colace)  100 mg PO BID [facility]


   Last Admin: 03/23/18 08:39 Dose:  Not Given


Enoxaparin Sodium (Lovenox)  40 mg SC 0900 [facility]


   Last Admin: 03/23/18 08:42 Dose:  40 mg


Ferrous Sulfate (Feosol)  325 mg PO QA-[facility]


   Last Admin: 03/23/18 08:42 Dose:  325 mg


Furosemide (Lasix)  20 mg SLOW IVP 0600,1400 [facility]


Glucagon (Glucagon)  1 mg IM PRN PRN


   PRN Reason: Hypoglycemia


Dextrose/Water (D5w)  1,000 mls @ 0 mls/hr IV .Q0M PRN; As Directed


   PRN Reason: Hypoglycemia


Sodium Chloride (Normal Saline 0.9%)  250 mls @ 0 mls/hr IVPB .BOLUS PRN; As 

Directed


   PRN Reason: SBP<90


   Last Admin: 03/18/18 00:35 Dose:  250 mls


Insulin Detemir 45 units/ (Miscellaneous Medication)  0.45 mls @ 0 mls/hr SC 

BID [facility]


   Last Admin: 03/23/18 08:35 Dose:  Not Given


Insulin Human Lispro (Humalog)  0 units SC .MODERATE SLIDING SC PRN


   PRN Reason: Moderate Correctional Scale


   Last Admin: 03/18/18 11:46 Dose:  4 unit


Insulin Human Lispro (Humalog)  0 units SC .BEDTIME SLIDING SC PRN


   PRN Reason: Bedtime Correctional Scale


   Last Admin: 03/17/18 21:12 Dose:  3 unit


Magnesium Oxide (Magnesium Oxide)  400 mg PO DAILY [facility]


   Last Admin: 03/23/18 08:42 Dose:  400 mg


Metformin HCl (Glucophage)  1,000 mg PO BID-[facility]


   Last Admin: 03/23/18 08:35 Dose:  Not Given


Ondansetron HCl (Zofran Odt)  4 mg PO Q6H PRN


   PRN Reason: Nausea/Vomiting


   Last Admin: 03/21/18 14:43 Dose:  4 mg


Ondansetron HCl (Zofran)  4 mg IVP Q6H PRN


   PRN Reason: Nausea/Vomiting


Pantoprazole Sodium (Protonix)  40 mg PO DAILY PRN


   PRN Reason: Indigestion


Sacubitril/Valsartan (Entresto 24.5 Mg-25.5 Mg Tablet)  1 tab PO BID [facility]


Sodium Chloride (Flush - Normal Saline)  10 ml IVF Q12HR [facility]


   Last Admin: 03/23/18 08:43 Dose:  10 ml


Sodium Chloride (Flush - Normal Saline)  10 ml IVF PRN PRN


   PRN Reason: Saline Flush


   Last Admin: 03/23/18 06:16 Dose:  10 ml


Spironolactone (Aldactone)  25 mg PO PRN PRN


   PRN Reason: SBP =/> 110


Spironolactone (Aldactone)  25 mg PO BID-[facility]


   Last Admin: 03/23/18 08:42 Dose:  25 mg

## 2018-03-24 LAB
ANION GAP SERPL CALC-SCNC: 11 MMOL/L (ref 10–20)
BUN SERPL-MCNC: 22 MG/DL (ref 7–18.7)
CALCIUM SERPL-MCNC: 9.8 MG/DL (ref 7.8–10.44)
CHLORIDE SERPL-SCNC: 105 MMOL/L (ref 98–107)
CO2 SERPL-SCNC: 24 MMOL/L (ref 22–29)
CREAT CL PREDICTED SERPL C-G-VRATE: 81 ML/MIN (ref 70–130)
GLUCOSE SERPL-MCNC: 163 MG/DL (ref 70–105)
POTASSIUM SERPL-SCNC: 4 MMOL/L (ref 3.5–5.1)
SODIUM SERPL-SCNC: 136 MMOL/L (ref 136–145)

## 2018-03-24 RX ADMIN — Medication SCH ML: at 20:24

## 2018-03-24 RX ADMIN — INSULIN LISPRO PRN UNIT: 100 INJECTION, SOLUTION INTRAVENOUS; SUBCUTANEOUS at 16:43

## 2018-03-24 RX ADMIN — INSULIN LISPRO PRN UNIT: 100 INJECTION, SOLUTION INTRAVENOUS; SUBCUTANEOUS at 11:47

## 2018-03-24 RX ADMIN — Medication SCH ML: at 09:13

## 2018-03-24 RX ADMIN — SACUBITRIL AND VALSARTAN SCH TAB: 49; 51 TABLET, FILM COATED ORAL at 20:19

## 2018-03-24 RX ADMIN — SACUBITRIL AND VALSARTAN SCH TAB: 49; 51 TABLET, FILM COATED ORAL at 09:10

## 2018-03-24 NOTE — PRG
DATE OF SERVICE:  03/23/2018

 

SUBJECTIVE:  Ms. Eckert is doing okay.  She did get hypoglycemic this morning.

 

PHYSICAL EXAMINATION:

VITAL SIGNS:  Her blood pressure was 99/68.  She did not receive the Coreg, pulse now is 108, most re
cent blood pressure is 115/70 which is really quite high for her.

LUNGS:  Clear.

CARDIAC:  She is tachycardic.

ABDOMEN:  Soft and nontender.

EXTREMITIES:  There is no edema.

 

LABORATORY DATA:  She had blood glucose of 47 at 5:00 a.m.

 

ASSESSMENT:

1.  Diabetes.

2.  Hypoglycemia.

3.  Congestive heart failure, severe systolic.

4.  Relatively low blood pressure.

 

PLAN:

1.  Try to get the Coreg, hold only if the systolic is under 90.

2.  Trial again with Entresto low dose hold if systolic under 90.

3.  Continue furosemide.

4.  Continue spironolactone.

5.  Would stop Glucotrol.  She probably needs to have the insulin dose reduced as well.

6.  Refractory heart failure, consideration for referral to a transplant center to see if she would b
e a candidate.  We will keep the patient over the weekend.

## 2018-03-24 NOTE — PDOC.PN
- Subjective


Encounter Start Date: 03/24/18


Encounter Start Time: 11:46





Ms. Guevara was seen in follow-up of CHF exacerbation. She says she feels ok, 

just a little overwhelmed. She is breathing better, and denies any chest pain.





- Objective


Resuscitation Status: 


 











Resuscitation Status           FULL:Full Resuscitation














MAR Reviewed: Yes


Vital Signs & Weight: 


 Vital Signs (12 hours)











  Temp Pulse Resp BP Pulse Ox


 


 03/24/18 09:05  97.9 F  103 H  14  89/57 L  99


 


 03/24/18 05:41     99/67 


 


 03/24/18 04:00  98.3 F  97  18  91/52 L  95








 Weight











Weight                         143 lb 9 oz














I&O: 


 











 03/23/18 03/24/18 03/25/18





 06:59 06:59 06:59


 


Intake Total 1217 1040 


 


Output Total 1450 1200 


 


Balance -233 -160 











Result Diagrams: 


 03/18/18 04:10





 03/24/18 05:42


Additional Labs: 


 Accuchecks











  03/24/18 03/23/18 03/23/18





  05:37 20:56 16:40


 


POC Glucose  161 H  200 H  182 H














Phys Exam





- Physical Examination


HEENT: PERRLA


Respiratory: no wheezing, no rales, clear to auscultation bilateral


Cardiovascular: RRR, no significant murmur, no rub


Gastrointestinal: soft, non-tender, positive bowel sounds


Musculoskeletal: no edema





Dx/Plan


(1) Acute on chronic systolic (congestive) heart failure


Code(s): I50.23 - ACUTE ON CHRONIC SYSTOLIC (CONGESTIVE) HEART FAILURE   Status

: Acute   Comment: Cardiology service discussed with pt re: possibility of 

cardiac transplant.


Pt getting furosemide when blood pressure allows   





(2) Hyperlipidemia


Code(s): E78.5 - HYPERLIPIDEMIA, UNSPECIFIED   Status: Chronic   





(3) Hypertension


Code(s): I10 - ESSENTIAL (PRIMARY) HYPERTENSION   Status: Chronic   


Qualifiers: 


   Hypertension type: essential hypertension   Qualified Code(s): I10 - 

Essential (primary) hypertension   


Comment: Trying to reinstate antihypertensives (beta blocker and ARB).   





(4) Diabetes mellitus, type II, insulin dependent


Code(s): E11.9 - TYPE 2 DIABETES MELLITUS WITHOUT COMPLICATIONS; Z79.4 - LONG 

TERM (CURRENT) USE OF INSULIN   Status: Chronic   Comment: accuchecks, insulin 

sliding scale   





- Plan





* Acute on chronic systolic heart failure- she is slowly diuresing- weight is 

down to 143, from 147 on admission


* She has received her first dose of Entresto today


* Monitor over the weekend on the new medication


* DM- blood glucose is better, after discontinuing Glypizide .

## 2018-03-25 LAB
ANION GAP SERPL CALC-SCNC: 11 MMOL/L (ref 10–20)
BUN SERPL-MCNC: 23 MG/DL (ref 7–18.7)
CALCIUM SERPL-MCNC: 9.6 MG/DL (ref 7.8–10.44)
CHLORIDE SERPL-SCNC: 105 MMOL/L (ref 98–107)
CO2 SERPL-SCNC: 29 MMOL/L (ref 22–29)
CREAT CL PREDICTED SERPL C-G-VRATE: 75 ML/MIN (ref 70–130)
GLUCOSE SERPL-MCNC: 134 MG/DL (ref 70–105)
POTASSIUM SERPL-SCNC: 4.3 MMOL/L (ref 3.5–5.1)
SODIUM SERPL-SCNC: 141 MMOL/L (ref 136–145)

## 2018-03-25 RX ADMIN — Medication SCH ML: at 12:15

## 2018-03-25 RX ADMIN — INSULIN LISPRO PRN UNIT: 100 INJECTION, SOLUTION INTRAVENOUS; SUBCUTANEOUS at 17:48

## 2018-03-25 RX ADMIN — INSULIN LISPRO PRN UNIT: 100 INJECTION, SOLUTION INTRAVENOUS; SUBCUTANEOUS at 12:16

## 2018-03-25 RX ADMIN — Medication SCH ML: at 21:13

## 2018-03-25 RX ADMIN — SACUBITRIL AND VALSARTAN SCH TAB: 49; 51 TABLET, FILM COATED ORAL at 22:07

## 2018-03-25 RX ADMIN — SACUBITRIL AND VALSARTAN SCH TAB: 49; 51 TABLET, FILM COATED ORAL at 08:41

## 2018-03-25 NOTE — PDOC.PN
- Subjective


Encounter Start Date: 03/25/18


Encounter Start Time: 10:38





Ms. Eckert was seen today in follow-up. She does not have any complaints this 

morning. She denies chest pain or shortness of breath. She has ambulated 

without difficulty.





- Objective


Resuscitation Status: 


 











Resuscitation Status           FULL:Full Resuscitation














MAR Reviewed: Yes


Vital Signs & Weight: 


 Vital Signs (12 hours)











  Temp Pulse Resp BP Pulse Ox


 


 03/25/18 08:40  98.2 F  97  14  90/59 L  97


 


 03/25/18 05:35  98.1 F  97  18  92/54 L  96


 


 03/25/18 03:19      97








 Weight











Weight                         143 lb 1 oz














I&O: 


 











 03/24/18 03/25/18 03/26/18





 06:59 06:59 06:59


 


Intake Total 1040 1680 


 


Output Total 1200 1675 


 


Balance -160 5 











Result Diagrams: 


 03/18/18 04:10





 03/25/18 05:46


Additional Labs: 


 Accuchecks











  03/24/18 03/24/18 03/24/18





  20:57 16:31 11:13


 


POC Glucose  224 H  161 H  193 H














Phys Exam





- Physical Examination


HEENT: PERRLA


Respiratory: no wheezing, no rales, no rhonchi, clear to auscultation bilateral


Cardiovascular: RRR, no significant murmur, no rub


Gastrointestinal: soft, non-tender, positive bowel sounds


Musculoskeletal: no edema





Dx/Plan


(1) Acute on chronic systolic (congestive) heart failure


Code(s): I50.23 - ACUTE ON CHRONIC SYSTOLIC (CONGESTIVE) HEART FAILURE   Status

: Acute   Comment: Cardiology service discussed with pt re: possibility of 

cardiac transplant.


Pt getting furosemide when blood pressure allows   





(2) Hyperlipidemia


Code(s): E78.5 - HYPERLIPIDEMIA, UNSPECIFIED   Status: Chronic   





(3) Hypertension


Code(s): I10 - ESSENTIAL (PRIMARY) HYPERTENSION   Status: Chronic   


Qualifiers: 


   Hypertension type: essential hypertension   Qualified Code(s): I10 - 

Essential (primary) hypertension   


Comment: Trying to reinstate antihypertensives (beta blocker and ARB).   





(4) Diabetes mellitus, type II, insulin dependent


Code(s): E11.9 - TYPE 2 DIABETES MELLITUS WITHOUT COMPLICATIONS; Z79.4 - LONG 

TERM (CURRENT) USE OF INSULIN   Status: Chronic   Comment: accuchecks, insulin 

sliding scale   





- Plan





* Acute on chronic systolic heart failure- improving with diuresis, and Entresto


* Hypotension- blood pressure has been in the 90's and she is asymptomatic with 

this


* Await further recommendations from Cardiology in the AM.

## 2018-03-26 VITALS — DIASTOLIC BLOOD PRESSURE: 56 MMHG | SYSTOLIC BLOOD PRESSURE: 92 MMHG

## 2018-03-26 VITALS — TEMPERATURE: 97.6 F

## 2018-03-26 LAB
ANION GAP SERPL CALC-SCNC: 11 MMOL/L (ref 10–20)
BUN SERPL-MCNC: 26 MG/DL (ref 7–18.7)
CALCIUM SERPL-MCNC: 9.7 MG/DL (ref 7.8–10.44)
CHLORIDE SERPL-SCNC: 103 MMOL/L (ref 98–107)
CO2 SERPL-SCNC: 27 MMOL/L (ref 22–29)
CREAT CL PREDICTED SERPL C-G-VRATE: 73 ML/MIN (ref 70–130)
GLUCOSE SERPL-MCNC: 104 MG/DL (ref 70–105)
POTASSIUM SERPL-SCNC: 4.4 MMOL/L (ref 3.5–5.1)
SODIUM SERPL-SCNC: 137 MMOL/L (ref 136–145)

## 2018-03-26 RX ADMIN — Medication SCH ML: at 09:11

## 2018-03-26 RX ADMIN — INSULIN LISPRO PRN UNIT: 100 INJECTION, SOLUTION INTRAVENOUS; SUBCUTANEOUS at 12:37

## 2018-03-26 RX ADMIN — Medication PRN ML: at 05:50

## 2018-03-26 RX ADMIN — SACUBITRIL AND VALSARTAN SCH TAB: 49; 51 TABLET, FILM COATED ORAL at 09:01

## 2018-03-26 NOTE — PRG
DATE OF SERVICE:  03/26/2018

 

HISTORY:  Ms. Eckert is feeling better.  She is breathing better.

 

PHYSICAL EXAMINATION:

VITAL SIGNS:  Her blood pressure, however, has been running low, at 4:00 a.m. is was 91/56, at 7:37 i
t was 83/57, pulse 97.

LUNGS:  Clear.

CARDIAC:  Normal S1, S2.

ABDOMEN:  Soft, nontender.

EXTREMITIES:  No edema.  

NECK:  The neck veins are not distended.

 

ASSESSMENT:

1.  Congestive heart failure, systolic, acute on chronic - clinically improved, but the doses of the 
heart failure medicines have been needed to be dramatically reduced.

2.  Iron deficiency anemia.  She has received intravenous iron.  

 

PLAN:  

1.  Check OptiVol today, probably be released home today [facility] [place] [facility] if [facility] would be a candidate for left ventricular assist device or even transplant.  


2.  We will give her 1 more dose of intravenous iron.

3.  Check OptiVol today.  Hopefully, go home.

## 2018-03-26 NOTE — PDOC.PN
- Subjective


Encounter Start Date: 03/26/18


Encounter Start Time: 09:20





Ms. Guevara was seen today in follow-up. She does not have any complaints today.





- Objective


Resuscitation Status: 


 











Resuscitation Status           FULL:Full Resuscitation














MAR Reviewed: Yes


Vital Signs & Weight: 


 Vital Signs (12 hours)











  Temp Pulse Resp BP BP Pulse Ox


 


 03/26/18 07:37  98.5 F  97  16  83/57 L   95


 


 03/26/18 05:48   94    92/58 L 


 


 03/26/18 04:00  97.7 F  93  20  91/56 L   97


 


 03/25/18 22:15  97.6 F  100  20  101/54 L   98








 Weight











Weight                         142 lb 8 oz














I&O: 


 











 03/25/18 03/26/18 03/27/18





 06:59 06:59 06:59


 


Intake Total 1680 720 


 


Output Total 1675 2000 


 


Balance 5 -1280 











Result Diagrams: 


 03/18/18 04:10





 03/26/18 04:18


Additional Labs: 


 Accuchecks











  03/26/18 03/25/18 03/25/18





  05:49 20:51 16:59


 


POC Glucose  121 H  100  155 H














  03/25/18





  11:37


 


POC Glucose  213 H














Phys Exam





- Physical Examination


HEENT: PERRLA


Respiratory: no wheezing, no rales, no rhonchi, clear to auscultation bilateral


Cardiovascular: RRR, no significant murmur, no rub


Gastrointestinal: soft, non-tender, positive bowel sounds


Musculoskeletal: edema present





Dx/Plan


(1) Acute on chronic systolic (congestive) heart failure


Code(s): I50.23 - ACUTE ON CHRONIC SYSTOLIC (CONGESTIVE) HEART FAILURE   Status

: Acute   Comment: Cardiology service discussed with pt re: possibility of 

cardiac transplant.


Pt getting furosemide when blood pressure allows   





(2) Hyperlipidemia


Code(s): E78.5 - HYPERLIPIDEMIA, UNSPECIFIED   Status: Chronic   





(3) Hypertension


Code(s): I10 - ESSENTIAL (PRIMARY) HYPERTENSION   Status: Chronic   


Qualifiers: 


   Hypertension type: essential hypertension   Qualified Code(s): I10 - 

Essential (primary) hypertension   


Comment: Trying to reinstate antihypertensives (beta blocker and ARB).   





(4) Diabetes mellitus, type II, insulin dependent


Code(s): E11.9 - TYPE 2 DIABETES MELLITUS WITHOUT COMPLICATIONS; Z79.4 - LONG 

TERM (CURRENT) USE OF INSULIN   Status: Chronic   Comment: accuchecks, insulin 

sliding scale   





- Plan





* Acute on chronic systolic heart failure- discussed with Dr. Daily- will have 

the defibrillator interrogated again today with regards to volume overload


* If the Heart failure has improved then possible discharge home later today, 

with outpatient follow-up with her doctors in [place]..

## 2018-03-26 NOTE — PRG
DATE OF SERVICE:  03/26/2018

 

SUBJECTIVE:  Ms. Eckert is feeling better.  She is breathing better.

 

PHYSICAL EXAMINATION:

VITAL SIGNS:  Her blood pressure is 92/54, pulse 94.

LUNGS:  Clear.

CARDIAC:  Normal S1 and normal S2.

ABDOMEN:  Soft, nontender.

EXTREMITIES:  There is no edema.

 

ASSESSMENT:  Congestive heart failure, clinically better compensated, but the OptiVol is still high, 
but at least trending the right direction.

 

PLAN:

1.  She is on Coreg 3.125 mg twice daily.

2.  Atorvastatin 20 mg a day.

3.  Lasix changed to 40 mg twice a day starting tomorrow.

4.  Potassium 20 mEq daily.

5.  Entresto 24/26 twice a day.

6.  Spironolactone 25 mg twice a day.

7.  I asked her to see in the office in a week.  We are going to refer her to Dr. Persaud in [place] to
 see if she would be a candidate for transplantation versus left ventricular assist device.

## 2018-03-27 NOTE — DIS
DATE OF ADMISSION:  03/16/2018

 

DATE OF DISCHARGE:  03/26/2018

 

PRIMARY CARE PHYSICIAN:  Alton Her M.D.

 

DISCHARGE DISPOSITION:  Home.

 

PRIMARY DISCHARGE DIAGNOSES:

1.  Acute on chronic systolic heart failure due to nonischemic cardiomyopathy.

2.  Diabetes mellitus type 2, insulin dependent.

3.  Hypertension.

4.  History of arrhythmia, status post ablation.

5.  Dyslipidemia.

 

DISCHARGE MEDICATIONS:  

1.  Entresto 24.5/25.5 mg twice a day.

2.  Spironolactone 25 mg twice daily.

3.  Potassium chloride 20 mEq daily.

4.  Protonix 40 mg daily.

5.  Metformin 1000 mg twice a day.

6.  Magnesium oxide 400 mg daily.

7.  Levemir insulin 45 units q. day.

8.  Lasix 40 mg twice a day.

9.  Iron sulfate 325 mg daily.

10.  Carvedilol 3.125 mg twice a day.

11.  Lipitor 20 mg at bedtime.

12.  Alprazolam 0.5 mg at bedtime.

 

PROCEDURES DONE DURING THE ADMISSION:  The patient had an echocardiogram in which demonstrated an eje
ction fraction of 10% to 15%.  Defibrillator wire was noted in the right ventricle, there was severe 
mitral regurgitation and moderate to severe tricuspid regurgitation.

 

CODE STATUS:  FULL CODE.

 

ALLERGIES:  AZITHROMYCIN, ERYTHROMYCIN, LATEX, NATURAL RUBBER,  DARVOCET and PROPOXYPHENE.

 

HOSPITAL COURSE:  Ms. Eckert is a pleasant 42-year-old female who presented to the emergency room w
ith complaints of shortness of breath and cough.  She was found to be in acute on chronic systolic he
art failure.  She has a known history of dilated cardiomyopathy, which is nonischemic.  She has been 
on medications for several years for this.  She was seen by Cardiology and an echocardiogram was obta
ined.  Unfortunately, her ejection fraction has not improved much and in fact, has worsened little bi
t since 2016, on her last recorded echo in our system.  Her doses of her medications were adjusted wh
ile in the hospital.  Most of which were actually decreased during her hospital stay.  She was treate
d with IV Lasix; however, she had minimal fluid removal with her weight initially being 143 and her w
eight at discharge was approximately 142; however, clinically she was improved.  Although, just modes
tly after her dose of insulin and diabetic medications were decreased, the insulin dose was decreased
 to only 45 units daily and the DiaBeta or glyburide was discontinued altogether and this was due to 
low blood sugar that she experienced during the hospital stay.  The patient did improve enough to be 
discharged home; however, Dr. Daily suspected that the patient may not improve much over the course 
of the next few years, so she continues on the same trajectory as she has in the past 2 years and lik
ely will need consideration for _____ therapy and/or cardiac transplantation.  The patient was made a
ware of this and plans to follow up with her cardiologist in [place].

## 2018-04-08 NOTE — EKG
Test Reason : 

Blood Pressure : ***/*** mmHG

Vent. Rate : 108 BPM     Atrial Rate : 108 BPM

   P-R Int : 144 ms          QRS Dur : 088 ms

    QT Int : 380 ms       P-R-T Axes : 058 006 164 degrees

   QTc Int : 509 ms

 

*** Poor data quality, interpretation may be adversely affected

Sinus tachycardia

Possible Left atrial enlargement

Nonspecific T wave abnormality

Abnormal ECG

 

Confirmed by MICHELLE ALLAN ([id number]),  BRENDA MADERA ([id number]) on 4/8/2018 12:24:15 AM

 

Referred By:             Confirmed By:MICHELLE ALLAN

## 2018-04-16 ENCOUNTER — HOSPITAL ENCOUNTER (OUTPATIENT)
Dept: HOSPITAL 9 - MADLABBHPM | Age: 43
Discharge: HOME | End: 2018-04-16
Payer: COMMERCIAL

## 2018-04-16 DIAGNOSIS — I50.22: Primary | ICD-10-CM

## 2018-04-16 LAB
ANION GAP SERPL CALC-SCNC: 20 MMOL/L (ref 10–20)
BUN SERPL-MCNC: 26 MG/DL (ref 7–18.7)
CALCIUM SERPL-MCNC: 10.5 MG/DL (ref 7.8–10.44)
CHLORIDE SERPL-SCNC: 98 MMOL/L (ref 98–107)
CO2 SERPL-SCNC: 26 MMOL/L (ref 22–29)
CREAT CL PREDICTED SERPL C-G-VRATE: 0 ML/MIN (ref 70–130)
GLUCOSE SERPL-MCNC: 312 MG/DL (ref 70–105)
POTASSIUM SERPL-SCNC: 4.7 MMOL/L (ref 3.5–5.1)
SODIUM SERPL-SCNC: 139 MMOL/L (ref 136–145)

## 2018-04-16 PROCEDURE — 36415 COLL VENOUS BLD VENIPUNCTURE: CPT

## 2018-04-16 PROCEDURE — 80048 BASIC METABOLIC PNL TOTAL CA: CPT

## 2018-07-13 ENCOUNTER — HOSPITAL ENCOUNTER (OUTPATIENT)
Dept: HOSPITAL 9 - MADLAB | Age: 43
Discharge: HOME | End: 2018-07-13
Payer: COMMERCIAL

## 2018-07-13 DIAGNOSIS — I50.20: Primary | ICD-10-CM

## 2018-07-13 LAB
ALBUMIN SERPL BCG-MCNC: 4.4 G/DL (ref 3.5–5)
ALP SERPL-CCNC: 78 U/L (ref 40–150)
ALT SERPL W P-5'-P-CCNC: 22 U/L (ref 8–55)
ANION GAP SERPL CALC-SCNC: 21 MMOL/L (ref 10–20)
AST SERPL-CCNC: 28 U/L (ref 5–34)
BILIRUB SERPL-MCNC: 0.7 MG/DL (ref 0.2–1.2)
BUN SERPL-MCNC: 18 MG/DL (ref 7–18.7)
CALCIUM SERPL-MCNC: 9.8 MG/DL (ref 7.8–10.44)
CHLORIDE SERPL-SCNC: 98 MMOL/L (ref 98–107)
CO2 SERPL-SCNC: 25 MMOL/L (ref 22–29)
CREAT CL PREDICTED SERPL C-G-VRATE: 0 ML/MIN (ref 70–130)
GLOBULIN SER CALC-MCNC: 3.9 G/DL (ref 2.4–3.5)
GLUCOSE SERPL-MCNC: 288 MG/DL (ref 70–105)
POTASSIUM SERPL-SCNC: 5.6 MMOL/L (ref 3.5–5.1)
SODIUM SERPL-SCNC: 138 MMOL/L (ref 136–145)

## 2018-07-13 PROCEDURE — 36415 COLL VENOUS BLD VENIPUNCTURE: CPT

## 2018-07-13 PROCEDURE — 80053 COMPREHEN METABOLIC PANEL: CPT

## 2018-07-16 ENCOUNTER — HOSPITAL ENCOUNTER (OUTPATIENT)
Dept: HOSPITAL 9 - MADLAB | Age: 43
Discharge: HOME | End: 2018-07-16
Payer: COMMERCIAL

## 2018-07-16 DIAGNOSIS — E87.5: Primary | ICD-10-CM

## 2018-07-16 LAB
ANION GAP SERPL CALC-SCNC: 20 MMOL/L (ref 10–20)
BUN SERPL-MCNC: 27 MG/DL (ref 7–18.7)
CALCIUM SERPL-MCNC: 9.8 MG/DL (ref 7.8–10.44)
CHLORIDE SERPL-SCNC: 98 MMOL/L (ref 98–107)
CO2 SERPL-SCNC: 25 MMOL/L (ref 22–29)
CREAT CL PREDICTED SERPL C-G-VRATE: 0 ML/MIN (ref 70–130)
GLUCOSE SERPL-MCNC: 257 MG/DL (ref 70–105)
POTASSIUM SERPL-SCNC: 4.1 MMOL/L (ref 3.5–5.1)
SODIUM SERPL-SCNC: 139 MMOL/L (ref 136–145)

## 2018-07-16 PROCEDURE — 80048 BASIC METABOLIC PNL TOTAL CA: CPT

## 2018-07-16 PROCEDURE — 36415 COLL VENOUS BLD VENIPUNCTURE: CPT

## 2019-02-18 NOTE — RAD
PA AND LATERAL VIEWS CHEST:

 

Date:  02/18/19 

 

HISTORY:  

Preoperative evaluation. 

 

FINDINGS:

 

Comparison made with exam of 03/16/18. 

 

The heart size is normal. There is a left-sided AICD. The lungs are expanded without focal areas of c
onsolidation, pneumothoraces, rola pulmonary edema, or pleural effusions. No acute osseous abnormali
ties are seen. 

 

IMPRESSION: 

No radiographic evidence of acute cardiopulmonary process. 

 

 

POS: [facility]

## 2019-07-16 NOTE — CT
CT OF ABDOMEN AND PELVIS NONCONTRAST:

 

IMPRESSION: 

11/27/2016.

 

CLINICAL HISTORY: 

Abdominal pain with dysuria, constipation.

 

FINDINGS: 

There is no evidence of urolithiasis or hydroureteral nephrosis.  There is prominent distention of th
e urinary bladder.  Moderate volume of retained fecal material is present throughout the colon.  Prio
r cholecystectomy noted.  The solid abdominal organs are limited in evaluation without the presence o
f IV contrast.  Visualized lung bases reveal no evidence of consolidation.  There is trace pleural fl
uid bilaterally.  Mild vascular calcification is present.  No free air.  Scattered areas of subcutane
ous increased density of the ventral abdominal wall may relate to sequelae from injections.  There is
 no free pelvic fluid.  Moderate distention of the rectum is present due to retained fecal material.

 

IMPRESSION: 

1.  Large volume of retained urine within the urinary bladder.  Recommend clinical correlation in thi
s regard.

 

2.  Extensive retained fecal material of the colon.  This does include a moderate degree of distentio
n of the rectum with associated mural prominence.  Recommend clinical correlation to exclude evidence
 of stercoral colitis.

 

3. Evaluation otherwise limited on the basis of noncontrast technique.

 

POS: [facility]

## 2022-03-29 NOTE — PDOC.CTH
Cardiology Progress Note





- Subjective





No new issues or complaints. 





- Objective


 Vital Signs











  Temp Pulse Pulse Pulse Resp BP BP


 


 03/24/18 16:42       


 


 03/24/18 15:30  98.3 F  96    16  


 


 03/24/18 14:22       


 


 03/24/18 12:49    103 H  101 H   110/58 L  90/55 L


 


 03/24/18 11:45  98.6 F  96    16  


 


 03/24/18 09:05  97.9 F  103 H    14  














  BP Pulse Ox Pulse Ox Pulse Ox


 


 03/24/18 16:42  99/68   


 


 03/24/18 15:30  96/65  97  


 


 03/24/18 14:22  89/56 L   


 


 03/24/18 12:49    100  98


 


 03/24/18 11:45  92/60  98  


 


 03/24/18 09:05  89/57 L  99  








 











Weight                         143 lb 9 oz














 











 03/23/18 03/24/18 03/25/18





 06:59 06:59 06:59


 


Intake Total 1217 1040 720


 


Output Total 1450 1200 1000


 


Balance -233 -160 -280














- Physical Examination


General/Neuro: alert & oriented x3, NAD


Neck: no JVD present


Lungs: CTA, unlabored respirations


Heart: RRR


Abdomen: NT/ND


Extremities: other: (no edema.)





- Telemetry


Telemetry Rhythm: NSR





- Labs


Result Diagrams: 


 03/18/18 04:10





 03/24/18 05:42


 Troponin/CKMB











CK-MB (CK-2)  0.5 ng/mL (0-6.6)   03/16/18  15:50    


 


Troponin I  0.010 ng/mL (< 0.028)   03/16/18  15:50    














- Assessment/Plan





1. Non ischemic CM EF at 10-15%


2. Acute on chronci systolic heart failure.


3. Relatively low BP





PLAN:


- Continue current meds.


- Currently tolerating low dose Entresto and coreg.
Cardiology Progress Note





- Subjective





No new issues. She is tolerating her meds. She is walking around with ease. 





- Objective


 Vital Signs











  Temp Pulse Pulse Pulse Resp BP BP


 


 03/25/18 16:59  98.2 F  98    16  


 


 03/25/18 12:46    98  97   84/52 L  93/47 L


 


 03/25/18 12:20  97.9 F  96    18  


 


 03/25/18 08:40  98.2 F  97    14  














  BP BP Pulse Ox Pulse Ox Pulse Ox


 


 03/25/18 16:59  90/65   100  


 


 03/25/18 12:46     97  99


 


 03/25/18 12:20   97/58 L  98  


 


 03/25/18 08:40  90/59 L   97  








 











Weight                         143 lb 1 oz














 











 03/24/18 03/25/18 03/26/18





 06:59 06:59 06:59


 


Intake Total 1040 1680 


 


Output Total 1200 1675 


 


Balance -160 5 














- Physical Examination


General/Neuro: alert & oriented x3, NAD


Neck: no JVD present


Lungs: CTA, unlabored respirations


Heart: RRR


Abdomen: NT/ND


Extremities: other: (no edema.)





- Telemetry


Telemetry Rhythm: NSR





- Labs


Result Diagrams: 


 03/18/18 04:10





 03/25/18 05:46


 Troponin/CKMB











CK-MB (CK-2)  0.5 ng/mL (0-6.6)   03/16/18  15:50    


 


Troponin I  0.010 ng/mL (< 0.028)   03/16/18  15:50    














- Assessment/Plan





1. Non ischemic CM EF at 10-15%


2. Acute on chronci systolic heart failure.


3. Relatively low BP





PLAN:


- Continue current meds.


- Currently tolerating low dose Entresto and coreg. 


- Home soon.
Ame

## 2022-05-04 ENCOUNTER — HOSPITAL ENCOUNTER (OUTPATIENT)
Dept: HOSPITAL 9 - MADLAB | Age: 47
Discharge: HOME | End: 2022-05-04
Payer: MEDICARE

## 2022-05-04 DIAGNOSIS — E11.65: Primary | ICD-10-CM

## 2022-05-04 LAB
ALBUMIN SERPL BCG-MCNC: 4.4 G/DL (ref 3.5–5)
ALP SERPL-CCNC: 93 U/L (ref 40–110)
ALT SERPL W P-5'-P-CCNC: 27 U/L (ref 8–55)
ANION GAP SERPL CALC-SCNC: 17 MMOL/L (ref 10–20)
AST SERPL-CCNC: 22 U/L (ref 5–34)
BILIRUB SERPL-MCNC: 0.5 MG/DL (ref 0.2–1.2)
BUN SERPL-MCNC: 19 MG/DL (ref 7–18.7)
CALCIUM SERPL-MCNC: 9.7 MG/DL (ref 7.8–10.44)
CHD RISK SERPL-RTO: 5.1 (ref ?–4.5)
CHLORIDE SERPL-SCNC: 103 MMOL/L (ref 98–107)
CHOLEST SERPL-MCNC: 164 MG/DL
CO2 SERPL-SCNC: 27 MMOL/L (ref 22–29)
CREAT CL PREDICTED SERPL C-G-VRATE: 0 ML/MIN (ref 70–130)
GLOBULIN SER CALC-MCNC: 3.7 G/DL (ref 2.4–3.5)
GLUCOSE SERPL-MCNC: 194 MG/DL (ref 70–105)
HDLC SERPL-MCNC: 32 MG/DL
POTASSIUM SERPL-SCNC: 4.3 MMOL/L (ref 3.5–5.1)
SODIUM SERPL-SCNC: 143 MMOL/L (ref 136–145)
TRIGL SERPL-MCNC: 570 MG/DL (ref ?–150)

## 2022-05-04 PROCEDURE — 83036 HEMOGLOBIN GLYCOSYLATED A1C: CPT

## 2022-05-04 PROCEDURE — 80061 LIPID PANEL: CPT

## 2022-05-04 PROCEDURE — 82043 UR ALBUMIN QUANTITATIVE: CPT

## 2022-05-04 PROCEDURE — 80053 COMPREHEN METABOLIC PANEL: CPT

## 2022-05-04 PROCEDURE — 36415 COLL VENOUS BLD VENIPUNCTURE: CPT

## 2022-07-04 ENCOUNTER — HOSPITAL ENCOUNTER (OUTPATIENT)
Dept: HOSPITAL 92 - 2SW | Age: 47
Setting detail: OBSERVATION
LOS: 1 days | Discharge: HOME | End: 2022-07-05
Attending: STUDENT IN AN ORGANIZED HEALTH CARE EDUCATION/TRAINING PROGRAM | Admitting: STUDENT IN AN ORGANIZED HEALTH CARE EDUCATION/TRAINING PROGRAM
Payer: COMMERCIAL

## 2022-07-04 ENCOUNTER — HOSPITAL ENCOUNTER (EMERGENCY)
Dept: HOSPITAL 9 - MADERS | Age: 47
Discharge: ANOTHER HOSPITAL | End: 2022-07-04
Payer: MEDICARE

## 2022-07-04 VITALS — BODY MASS INDEX: 29.4 KG/M2

## 2022-07-04 DIAGNOSIS — M79.602: ICD-10-CM

## 2022-07-04 DIAGNOSIS — Z79.899: ICD-10-CM

## 2022-07-04 DIAGNOSIS — R07.89: Primary | ICD-10-CM

## 2022-07-04 DIAGNOSIS — Z20.822: ICD-10-CM

## 2022-07-04 DIAGNOSIS — I11.0: ICD-10-CM

## 2022-07-04 DIAGNOSIS — I08.1: ICD-10-CM

## 2022-07-04 DIAGNOSIS — I50.20: ICD-10-CM

## 2022-07-04 DIAGNOSIS — Z88.1: ICD-10-CM

## 2022-07-04 DIAGNOSIS — I25.10: ICD-10-CM

## 2022-07-04 DIAGNOSIS — Z88.5: ICD-10-CM

## 2022-07-04 DIAGNOSIS — I42.9: ICD-10-CM

## 2022-07-04 DIAGNOSIS — M43.12: ICD-10-CM

## 2022-07-04 DIAGNOSIS — M54.2: ICD-10-CM

## 2022-07-04 DIAGNOSIS — E11.9: ICD-10-CM

## 2022-07-04 DIAGNOSIS — E78.5: ICD-10-CM

## 2022-07-04 DIAGNOSIS — Z79.84: ICD-10-CM

## 2022-07-04 DIAGNOSIS — Z79.4: ICD-10-CM

## 2022-07-04 DIAGNOSIS — Z90.5: ICD-10-CM

## 2022-07-04 DIAGNOSIS — Z91.040: ICD-10-CM

## 2022-07-04 DIAGNOSIS — I50.9: ICD-10-CM

## 2022-07-04 DIAGNOSIS — Z95.810: ICD-10-CM

## 2022-07-04 DIAGNOSIS — Z79.82: ICD-10-CM

## 2022-07-04 LAB
ALBUMIN SERPL BCG-MCNC: 4.4 G/DL (ref 3.5–5)
ALP SERPL-CCNC: 85 U/L (ref 40–110)
ALT SERPL W P-5'-P-CCNC: 27 U/L (ref 8–55)
ANION GAP SERPL CALC-SCNC: 18 MMOL/L (ref 10–20)
AST SERPL-CCNC: 18 U/L (ref 5–34)
BASOPHILS # BLD AUTO: 0.1 THOU/UL (ref 0–0.2)
BASOPHILS NFR BLD AUTO: 1.2 % (ref 0–1)
BILIRUB SERPL-MCNC: 0.5 MG/DL (ref 0.2–1.2)
BUN SERPL-MCNC: 18 MG/DL (ref 7–18.7)
CALCIUM SERPL-MCNC: 9.4 MG/DL (ref 7.8–10.44)
CHLORIDE SERPL-SCNC: 106 MMOL/L (ref 98–107)
CO2 SERPL-SCNC: 24 MMOL/L (ref 22–29)
CREAT CL PREDICTED SERPL C-G-VRATE: 0 ML/MIN (ref 70–130)
EOSINOPHIL # BLD AUTO: 0.1 THOU/UL (ref 0–0.7)
EOSINOPHIL NFR BLD AUTO: 1.4 % (ref 0–10)
GLOBULIN SER CALC-MCNC: 3.3 G/DL (ref 2.4–3.5)
GLUCOSE SERPL-MCNC: 133 MG/DL (ref 70–105)
HGB BLD-MCNC: 11.6 G/DL (ref 12–16)
LIPASE SERPL-CCNC: 26 U/L (ref 8–78)
LYMPHOCYTES # BLD AUTO: 2.1 THOU/UL (ref 1.2–3.4)
LYMPHOCYTES NFR BLD AUTO: 38.5 % (ref 21–51)
MCH RBC QN AUTO: 26.3 PG (ref 27–31)
MCV RBC AUTO: 78.6 FL (ref 78–98)
MONOCYTES # BLD AUTO: 0.3 THOU/UL (ref 0.11–0.59)
MONOCYTES NFR BLD AUTO: 5.5 % (ref 0–10)
NEUTROPHILS # BLD AUTO: 2.9 THOU/UL (ref 1.4–6.5)
NEUTROPHILS NFR BLD AUTO: 53.4 % (ref 42–75)
PLATELET # BLD AUTO: 173 THOU/UL (ref 130–400)
POTASSIUM SERPL-SCNC: 3.5 MMOL/L (ref 3.5–5.1)
RBC # BLD AUTO: 4.41 MILL/UL (ref 4.2–5.4)
SODIUM SERPL-SCNC: 144 MMOL/L (ref 136–145)
TROPONIN I SERPL DL<=0.01 NG/ML-MCNC: (no result) NG/ML (ref ?–0.03)
WBC # BLD AUTO: 5.5 THOU/UL (ref 4.8–10.8)

## 2022-07-04 PROCEDURE — 93005 ELECTROCARDIOGRAM TRACING: CPT

## 2022-07-04 PROCEDURE — 84484 ASSAY OF TROPONIN QUANT: CPT

## 2022-07-04 PROCEDURE — 93306 TTE W/DOPPLER COMPLETE: CPT

## 2022-07-04 PROCEDURE — 36415 COLL VENOUS BLD VENIPUNCTURE: CPT

## 2022-07-04 PROCEDURE — 71045 X-RAY EXAM CHEST 1 VIEW: CPT

## 2022-07-04 PROCEDURE — U0003 INFECTIOUS AGENT DETECTION BY NUCLEIC ACID (DNA OR RNA); SEVERE ACUTE RESPIRATORY SYNDROME CORONAVIRUS 2 (SARS-COV-2) (CORONAVIRUS DISEASE [COVID-19]), AMPLIFIED PROBE TECHNIQUE, MAKING USE OF HIGH THROUGHPUT TECHNOLOGIES AS DESCRIBED BY CMS-2020-01-R: HCPCS

## 2022-07-04 PROCEDURE — 80053 COMPREHEN METABOLIC PANEL: CPT

## 2022-07-04 PROCEDURE — A9500 TC99M SESTAMIBI: HCPCS

## 2022-07-04 PROCEDURE — 94760 N-INVAS EAR/PLS OXIMETRY 1: CPT

## 2022-07-04 PROCEDURE — 96376 TX/PRO/DX INJ SAME DRUG ADON: CPT

## 2022-07-04 PROCEDURE — 93017 CV STRESS TEST TRACING ONLY: CPT

## 2022-07-04 PROCEDURE — G0378 HOSPITAL OBSERVATION PER HR: HCPCS

## 2022-07-04 PROCEDURE — 72040 X-RAY EXAM NECK SPINE 2-3 VW: CPT

## 2022-07-04 PROCEDURE — 83690 ASSAY OF LIPASE: CPT

## 2022-07-04 PROCEDURE — 83735 ASSAY OF MAGNESIUM: CPT

## 2022-07-04 PROCEDURE — U0005 INFEC AGEN DETEC AMPLI PROBE: HCPCS

## 2022-07-04 PROCEDURE — 85025 COMPLETE CBC W/AUTO DIFF WBC: CPT

## 2022-07-04 PROCEDURE — 78452 HT MUSCLE IMAGE SPECT MULT: CPT

## 2022-07-05 VITALS — SYSTOLIC BLOOD PRESSURE: 126 MMHG | DIASTOLIC BLOOD PRESSURE: 72 MMHG | TEMPERATURE: 97.7 F

## 2022-07-05 LAB
MAGNESIUM SERPL-MCNC: 2 MG/DL (ref 1.6–2.6)
TROPONIN I SERPL DL<=0.01 NG/ML-MCNC: (no result) NG/ML (ref ?–0.03)

## 2023-03-24 ENCOUNTER — HOSPITAL ENCOUNTER (OUTPATIENT)
Dept: HOSPITAL 92 - NM | Age: 48
Discharge: HOME | End: 2023-03-24
Attending: INTERNAL MEDICINE
Payer: COMMERCIAL

## 2023-03-24 DIAGNOSIS — R68.81: Primary | ICD-10-CM

## 2023-03-24 PROCEDURE — A9541 TC99M SULFUR COLLOID: HCPCS

## 2023-03-24 PROCEDURE — 78264 GASTRIC EMPTYING IMG STUDY: CPT

## 2023-09-19 ENCOUNTER — HOSPITAL ENCOUNTER (OUTPATIENT)
Dept: HOSPITAL 92 - BICCT | Age: 48
Discharge: HOME | End: 2023-09-19
Attending: INTERNAL MEDICINE
Payer: COMMERCIAL

## 2023-09-19 DIAGNOSIS — I50.9: Primary | ICD-10-CM

## 2023-09-19 DIAGNOSIS — K21.9: ICD-10-CM

## 2023-09-19 DIAGNOSIS — E11.9: ICD-10-CM

## 2023-09-19 DIAGNOSIS — R10.9: ICD-10-CM

## 2023-09-19 LAB — EGFRCR SERPLBLD CKD-EPI 2021: 78 ML/MIN/{1.73_M2}

## 2023-09-19 PROCEDURE — 74177 CT ABD & PELVIS W/CONTRAST: CPT

## 2023-09-19 PROCEDURE — 82565 ASSAY OF CREATININE: CPT

## 2023-11-30 ENCOUNTER — HOSPITAL ENCOUNTER (OUTPATIENT)
Dept: HOSPITAL 92 - SDC | Age: 48
Discharge: HOME | End: 2023-11-30
Attending: INTERNAL MEDICINE
Payer: COMMERCIAL

## 2023-11-30 VITALS — BODY MASS INDEX: 29.3 KG/M2

## 2023-11-30 DIAGNOSIS — Z90.710: ICD-10-CM

## 2023-11-30 DIAGNOSIS — Z91.040: ICD-10-CM

## 2023-11-30 DIAGNOSIS — I48.91: ICD-10-CM

## 2023-11-30 DIAGNOSIS — I11.0: ICD-10-CM

## 2023-11-30 DIAGNOSIS — K44.9: ICD-10-CM

## 2023-11-30 DIAGNOSIS — Z12.11: Primary | ICD-10-CM

## 2023-11-30 DIAGNOSIS — K21.00: ICD-10-CM

## 2023-11-30 DIAGNOSIS — Z90.89: ICD-10-CM

## 2023-11-30 DIAGNOSIS — Z88.1: ICD-10-CM

## 2023-11-30 DIAGNOSIS — I50.9: ICD-10-CM

## 2023-11-30 DIAGNOSIS — I25.10: ICD-10-CM

## 2023-11-30 DIAGNOSIS — K52.832: ICD-10-CM

## 2023-11-30 DIAGNOSIS — Z90.49: ICD-10-CM

## 2023-11-30 DIAGNOSIS — Z79.4: ICD-10-CM

## 2023-11-30 DIAGNOSIS — Z79.84: ICD-10-CM

## 2023-11-30 DIAGNOSIS — E78.5: ICD-10-CM

## 2023-11-30 DIAGNOSIS — Z79.82: ICD-10-CM

## 2023-11-30 DIAGNOSIS — Z79.899: ICD-10-CM

## 2023-11-30 DIAGNOSIS — E11.9: ICD-10-CM

## 2023-11-30 DIAGNOSIS — K64.8: ICD-10-CM

## 2023-11-30 DIAGNOSIS — K31.89: ICD-10-CM

## 2023-11-30 PROCEDURE — 88305 TISSUE EXAM BY PATHOLOGIST: CPT

## 2023-11-30 PROCEDURE — 0DBE8ZX EXCISION OF LARGE INTESTINE, VIA NATURAL OR ARTIFICIAL OPENING ENDOSCOPIC, DIAGNOSTIC: ICD-10-PCS | Performed by: INTERNAL MEDICINE

## 2023-11-30 PROCEDURE — 36416 COLLJ CAPILLARY BLOOD SPEC: CPT

## 2023-11-30 PROCEDURE — 0DB68ZX EXCISION OF STOMACH, VIA NATURAL OR ARTIFICIAL OPENING ENDOSCOPIC, DIAGNOSTIC: ICD-10-PCS | Performed by: INTERNAL MEDICINE
